# Patient Record
Sex: FEMALE | Race: WHITE | NOT HISPANIC OR LATINO | Employment: OTHER | URBAN - METROPOLITAN AREA
[De-identification: names, ages, dates, MRNs, and addresses within clinical notes are randomized per-mention and may not be internally consistent; named-entity substitution may affect disease eponyms.]

---

## 2018-03-06 ENCOUNTER — PROCEDURE VISIT CONVERTED (OUTPATIENT)
Dept: PODIATRY | Facility: CLINIC | Age: 63
End: 2018-03-06
Attending: PODIATRIST

## 2018-03-06 ENCOUNTER — CONVERSION ENCOUNTER (OUTPATIENT)
Dept: PODIATRY | Facility: CLINIC | Age: 63
End: 2018-03-06

## 2018-03-19 ENCOUNTER — CONVERSION ENCOUNTER (OUTPATIENT)
Dept: CARDIOLOGY | Facility: CLINIC | Age: 63
End: 2018-03-19

## 2018-03-19 ENCOUNTER — OFFICE VISIT CONVERTED (OUTPATIENT)
Dept: CARDIOLOGY | Facility: CLINIC | Age: 63
End: 2018-03-19
Attending: INTERNAL MEDICINE

## 2018-06-11 ENCOUNTER — CONVERSION ENCOUNTER (OUTPATIENT)
Dept: OTHER | Facility: HOSPITAL | Age: 63
End: 2018-06-11

## 2018-06-11 ENCOUNTER — PROCEDURE VISIT CONVERTED (OUTPATIENT)
Dept: PODIATRY | Facility: CLINIC | Age: 63
End: 2018-06-11
Attending: PODIATRIST

## 2018-09-10 ENCOUNTER — CONVERSION ENCOUNTER (OUTPATIENT)
Dept: PODIATRY | Facility: CLINIC | Age: 63
End: 2018-09-10

## 2018-09-10 ENCOUNTER — PROCEDURE VISIT CONVERTED (OUTPATIENT)
Dept: PODIATRY | Facility: CLINIC | Age: 63
End: 2018-09-10
Attending: PODIATRIST

## 2019-07-29 ENCOUNTER — OFFICE VISIT CONVERTED (OUTPATIENT)
Dept: CARDIOLOGY | Facility: CLINIC | Age: 64
End: 2019-07-29
Attending: INTERNAL MEDICINE

## 2019-07-29 ENCOUNTER — CONVERSION ENCOUNTER (OUTPATIENT)
Dept: CARDIOLOGY | Facility: CLINIC | Age: 64
End: 2019-07-29

## 2019-08-28 ENCOUNTER — CONVERSION ENCOUNTER (OUTPATIENT)
Dept: CARDIOLOGY | Facility: CLINIC | Age: 64
End: 2019-08-28
Attending: INTERNAL MEDICINE

## 2019-11-07 ENCOUNTER — HOSPITAL ENCOUNTER (OUTPATIENT)
Dept: OTHER | Facility: HOSPITAL | Age: 64
Discharge: HOME | End: 2019-11-07

## 2019-11-07 LAB
25(OH)D3 SERPL-MCNC: 29.8 NG/ML (ref 30–100)
ALBUMIN SERPL-MCNC: 3.6 G/DL (ref 3.5–5)
ALBUMIN/GLOB SERPL: 1.2 {RATIO} (ref 1.4–2.6)
ALP SERPL-CCNC: 57 U/L (ref 43–160)
ALT SERPL-CCNC: 32 U/L (ref 10–40)
ANION GAP SERPL CALC-SCNC: 17 MMOL/L (ref 8–19)
AST SERPL-CCNC: 30 U/L (ref 15–50)
BASOPHILS # BLD AUTO: 0.09 10*3/UL (ref 0–0.2)
BASOPHILS NFR BLD AUTO: 1.1 % (ref 0–3)
BILIRUB SERPL-MCNC: 0.54 MG/DL (ref 0.2–1.3)
BUN SERPL-MCNC: 13 MG/DL (ref 5–25)
BUN/CREAT SERPL: 19 {RATIO} (ref 6–20)
CALCIUM SERPL-MCNC: 9.6 MG/DL (ref 8.7–10.4)
CHLORIDE SERPL-SCNC: 101 MMOL/L (ref 99–111)
CONV ABS IMM GRAN: 0.04 10*3/UL (ref 0–0.2)
CONV CO2: 25 MMOL/L (ref 22–32)
CONV IMMATURE GRAN: 0.5 % (ref 0–1.8)
CONV TOTAL PROTEIN: 6.5 G/DL (ref 6.3–8.2)
CREAT UR-MCNC: 0.69 MG/DL (ref 0.5–0.9)
DEPRECATED RDW RBC AUTO: 48.1 FL (ref 36.4–46.3)
EOSINOPHIL # BLD AUTO: 0.16 10*3/UL (ref 0–0.7)
EOSINOPHIL # BLD AUTO: 1.9 % (ref 0–7)
ERYTHROCYTE [DISTWIDTH] IN BLOOD BY AUTOMATED COUNT: 15 % (ref 11.7–14.4)
GFR SERPLBLD BASED ON 1.73 SQ M-ARVRAT: >60 ML/MIN/{1.73_M2}
GLOBULIN UR ELPH-MCNC: 2.9 G/DL (ref 2–3.5)
GLUCOSE SERPL-MCNC: 115 MG/DL (ref 65–99)
HCT VFR BLD AUTO: 38.1 % (ref 37–47)
HGB BLD-MCNC: 11.9 G/DL (ref 12–16)
LYMPHOCYTES # BLD AUTO: 2.34 10*3/UL (ref 1–5)
LYMPHOCYTES NFR BLD AUTO: 27.7 % (ref 20–45)
MCH RBC QN AUTO: 28 PG (ref 27–31)
MCHC RBC AUTO-ENTMCNC: 31.2 G/DL (ref 33–37)
MCV RBC AUTO: 89.6 FL (ref 81–99)
MONOCYTES # BLD AUTO: 0.85 10*3/UL (ref 0.2–1.2)
MONOCYTES NFR BLD AUTO: 10 % (ref 3–10)
NEUTROPHILS # BLD AUTO: 4.98 10*3/UL (ref 2–8)
NEUTROPHILS NFR BLD AUTO: 58.8 % (ref 30–85)
NRBC CBCN: 0 % (ref 0–0.7)
OSMOLALITY SERPL CALC.SUM OF ELEC: 287 MOSM/KG (ref 273–304)
PLATELET # BLD AUTO: 187 10*3/UL (ref 130–400)
PMV BLD AUTO: 11.9 FL (ref 9.4–12.3)
POTASSIUM SERPL-SCNC: 5.1 MMOL/L (ref 3.5–5.3)
RBC # BLD AUTO: 4.25 10*6/UL (ref 4.2–5.4)
SODIUM SERPL-SCNC: 138 MMOL/L (ref 135–147)
WBC # BLD AUTO: 8.46 10*3/UL (ref 4.8–10.8)

## 2020-02-01 ENCOUNTER — LAB REQUISITION (OUTPATIENT)
Dept: LAB | Facility: HOSPITAL | Age: 65
End: 2020-02-01

## 2020-02-01 DIAGNOSIS — Z00.00 ROUTINE GENERAL MEDICAL EXAMINATION AT A HEALTH CARE FACILITY: ICD-10-CM

## 2020-02-01 LAB
BACTERIA UR QL AUTO: ABNORMAL /HPF
BILIRUB UR QL STRIP: NEGATIVE
CLARITY UR: ABNORMAL
COLOR UR: YELLOW
GLUCOSE UR STRIP-MCNC: NEGATIVE MG/DL
HGB UR QL STRIP.AUTO: NEGATIVE
HYALINE CASTS UR QL AUTO: ABNORMAL /LPF
KETONES UR QL STRIP: NEGATIVE
LEUKOCYTE ESTERASE UR QL STRIP.AUTO: ABNORMAL
NITRITE UR QL STRIP: NEGATIVE
PH UR STRIP.AUTO: 6.5 [PH] (ref 5–8)
PROT UR QL STRIP: NEGATIVE
RBC # UR: ABNORMAL /HPF
REF LAB TEST METHOD: ABNORMAL
SP GR UR STRIP: 1.02 (ref 1–1.03)
SQUAMOUS #/AREA URNS HPF: ABNORMAL /HPF
UROBILINOGEN UR QL STRIP: ABNORMAL
WBC UR QL AUTO: ABNORMAL /HPF

## 2020-02-01 PROCEDURE — 81001 URINALYSIS AUTO W/SCOPE: CPT

## 2021-05-15 VITALS
HEIGHT: 67 IN | HEART RATE: 56 BPM | WEIGHT: 202 LBS | BODY MASS INDEX: 31.71 KG/M2 | DIASTOLIC BLOOD PRESSURE: 88 MMHG | SYSTOLIC BLOOD PRESSURE: 166 MMHG

## 2021-05-16 VITALS
DIASTOLIC BLOOD PRESSURE: 90 MMHG | WEIGHT: 205 LBS | SYSTOLIC BLOOD PRESSURE: 140 MMHG | OXYGEN SATURATION: 97 % | HEIGHT: 66 IN | BODY MASS INDEX: 32.95 KG/M2 | HEART RATE: 63 BPM

## 2021-05-16 VITALS — HEIGHT: 67 IN | WEIGHT: 193 LBS | OXYGEN SATURATION: 97 % | HEART RATE: 59 BPM | BODY MASS INDEX: 30.29 KG/M2

## 2021-05-16 VITALS
BODY MASS INDEX: 30.61 KG/M2 | DIASTOLIC BLOOD PRESSURE: 114 MMHG | WEIGHT: 195 LBS | SYSTOLIC BLOOD PRESSURE: 216 MMHG | HEART RATE: 60 BPM | HEIGHT: 67 IN

## 2021-05-16 VITALS
WEIGHT: 194 LBS | HEART RATE: 63 BPM | RESPIRATION RATE: 20 BRPM | BODY MASS INDEX: 30.45 KG/M2 | HEIGHT: 67 IN | OXYGEN SATURATION: 96 %

## 2021-08-20 PROBLEM — I10 BENIGN ESSENTIAL HTN: Status: ACTIVE | Noted: 2021-08-20

## 2021-08-23 ENCOUNTER — OFFICE VISIT (OUTPATIENT)
Dept: CARDIOLOGY | Facility: CLINIC | Age: 66
End: 2021-08-23

## 2021-08-23 VITALS
DIASTOLIC BLOOD PRESSURE: 66 MMHG | HEART RATE: 63 BPM | SYSTOLIC BLOOD PRESSURE: 124 MMHG | BODY MASS INDEX: 31.23 KG/M2 | HEIGHT: 67 IN | WEIGHT: 199 LBS

## 2021-08-23 DIAGNOSIS — I10 BENIGN ESSENTIAL HTN: ICD-10-CM

## 2021-08-23 DIAGNOSIS — R07.89 OTHER CHEST PAIN: Primary | ICD-10-CM

## 2021-08-23 DIAGNOSIS — I48.0 PAROXYSMAL ATRIAL FIBRILLATION (HCC): ICD-10-CM

## 2021-08-23 PROCEDURE — 93000 ELECTROCARDIOGRAM COMPLETE: CPT | Performed by: INTERNAL MEDICINE

## 2021-08-23 PROCEDURE — 99214 OFFICE O/P EST MOD 30 MIN: CPT | Performed by: NURSE PRACTITIONER

## 2021-08-23 RX ORDER — CHOLECALCIFEROL (VITAMIN D3) 125 MCG
CAPSULE ORAL
COMMUNITY

## 2021-08-23 RX ORDER — HYDROCHLOROTHIAZIDE 25 MG/1
25 TABLET ORAL DAILY
COMMUNITY
Start: 2021-08-21

## 2021-08-23 RX ORDER — EZETIMIBE 10 MG/1
10 TABLET ORAL DAILY
COMMUNITY
Start: 2021-08-17

## 2021-08-23 RX ORDER — FAMOTIDINE 20 MG/1
TABLET, FILM COATED ORAL DAILY
COMMUNITY
Start: 2021-08-21 | End: 2022-11-23

## 2021-08-23 RX ORDER — AMLODIPINE BESYLATE 5 MG/1
5 TABLET ORAL DAILY
COMMUNITY
Start: 2021-08-11 | End: 2022-11-23

## 2021-08-23 RX ORDER — LEVOTHYROXINE SODIUM 0.05 MG/1
50 TABLET ORAL DAILY
COMMUNITY

## 2021-08-23 RX ORDER — FENOFIBRATE 160 MG/1
160 TABLET ORAL DAILY
COMMUNITY
Start: 2021-07-28

## 2021-08-23 RX ORDER — SPIRONOLACTONE 25 MG/1
25 TABLET ORAL DAILY
COMMUNITY
Start: 2021-08-13

## 2021-08-23 RX ORDER — APIXABAN 5 MG/1
5 TABLET, FILM COATED ORAL 2 TIMES DAILY
COMMUNITY
Start: 2021-08-09

## 2021-08-23 RX ORDER — SERTRALINE HYDROCHLORIDE 25 MG/1
25 TABLET, FILM COATED ORAL DAILY
COMMUNITY
Start: 2021-08-16

## 2021-08-23 RX ORDER — RISPERIDONE 0.5 MG/1
TABLET ORAL DAILY
COMMUNITY
Start: 2021-08-13

## 2021-08-23 RX ORDER — NITROGLYCERIN 40 MG/1
1 PATCH TRANSDERMAL SEE ADMIN INSTRUCTIONS
Qty: 90 PATCH | Refills: 3 | Status: SHIPPED | OUTPATIENT
Start: 2021-08-23

## 2021-08-23 NOTE — PROGRESS NOTES
Chief Complaint  Follow-up, Chest Pain, Hypertension, Cardiomyopathy, Atrial Fibrillation, and Hyperlipidemia    Subjective            Lydia Willoughby presents to [facility] CARDIOLOGY  She is a 66-year-old white female has not been in the office in over 2 years now.  She is now resides in a nursing home after a CVA.  Continues to complain of chest pain described it as occurring when she is walking with her walker or doing physical therapy.  Vague in description but is mostly associate with right arm discomfort.  States it lasted few minutes. Denies shortness of breath, palpitations, swelling, dizziness, syncope, PND, and orthopnea.  Has had both Covid vaccines.              Past History:    Past Medical History:   Diagnosis Date   • Congestive heart failure (CHF) (CMS/HCC)    • Diabetes mellitus (CMS/HCC) 09/10/2018   • Foot cramps    • Foot pain, left    • Foot pain, right    • Fracture    • Heart attack (CMS/HCC)    • High blood cholesterol    • Hypertension    • Ingrowing toenail 09/10/2018   • Irregular heart beat    • Obesity    • Polyneuropathy    • Pressure ulcer, stage 1    • Stroke (CMS/HCC)    • Tinea unguium    • Type 2 diabetes mellitus with foot ulcer (CMS/HCC)    • Type 2 diabetes mellitus with polyneuropathy (CMS/HCC)         Family History: family history includes Cancer in her father; Diabetes in her brother and mother; Heart attack in an other family member; Stroke in her brother.     Social History: reports that she has quit smoking. She has quit using smokeless tobacco. She reports current alcohol use. She reports that she does not use drugs.    Allergies: Penicillins      Past Surgical History:   Procedure Laterality Date   • CERVICAL DISC SURGERY     • EYE SURGERY      CATARACT   • TONSILLECTOMY     • TUBAL ABDOMINAL LIGATION          Prior to Admission medications    Medication Sig Start Date End Date Taking? Authorizing Provider   amLODIPine (NORVASC) 5 MG tablet Take 5 mg  by mouth Daily. 8/11/21  Yes Kristina Newton MD   Cholecalciferol (Vitamin D3) 50 MCG (2000 UT) tablet Take  by mouth.   Yes Kristina Newton MD   Cyanocobalamin (VITAMIN B-12 IJ) Inject  as directed.   Yes Kristina Newton MD   Eliquis 5 MG tablet tablet Take 5 mg by mouth 2 (two) times a day. 8/9/21  Yes Kristina Newton MD   ezetimibe (ZETIA) 10 MG tablet Take 10 mg by mouth Daily. 8/17/21  Yes Kristina Newton MD   famotidine (PEPCID) 20 MG tablet Daily. 8/21/21  Yes Provider, Historical, MD   fenofibrate 160 MG tablet Take 160 mg by mouth Daily. 7/28/21  Yes Kristina Newton MD   hydroCHLOROthiazide (HYDRODIURIL) 25 MG tablet Take 25 mg by mouth Daily. 8/21/21  Yes Kristina Newton MD   levothyroxine (SYNTHROID, LEVOTHROID) 50 MCG tablet Take 50 mcg by mouth Daily.   Yes Kristina Newton MD   metFORMIN (GLUCOPHAGE) 1000 MG tablet  7/19/21  Yes Kristina Newton MD   risperiDONE (risperDAL) 0.5 MG tablet Daily. 8/13/21  Yes Kristina Newton MD   sertraline (ZOLOFT) 25 MG tablet Take 25 mg by mouth Daily. 8/16/21  Yes Kristina Newton MD   sertraline (ZOLOFT) 50 MG tablet Take 50 mg by mouth Daily.   Yes Kristina Newton MD   spironolactone (ALDACTONE) 25 MG tablet Take 25 mg by mouth 2 (two) times a day. 8/13/21  Yes Kristina Newton MD        Review of Systems   Respiratory: Positive for cough.    Cardiovascular: Positive for chest pain.   Neurological: Positive for weakness.   All other systems reviewed and are negative.       Objective     Physical Exam  Constitutional:       Appearance: She is obese.      Comments: In a wheelchair accompanied by nursing home staff   Eyes:      Pupils: Pupils are equal, round, and reactive to light.   Neck:      Vascular: No carotid bruit.   Cardiovascular:      Rate and Rhythm: Normal rate and regular rhythm.      Pulses: Normal pulses.      Heart sounds: Normal heart sounds.   Pulmonary:      Effort: Pulmonary  "effort is normal.      Breath sounds: Normal breath sounds.   Abdominal:      General: Bowel sounds are normal.      Palpations: Abdomen is soft.   Musculoskeletal:      Right lower leg: No edema.      Left lower leg: No edema.   Neurological:      Mental Status: She is alert.      Comments: Some right-sided hemiparesis   Psychiatric:         Mood and Affect: Mood normal.         Behavior: Behavior normal.       /66   Pulse 63   Ht 170.2 cm (67\")   Wt 90.3 kg (199 lb)   BMI 31.17 kg/m²       Vitals:    08/23/21 1513   BP: 124/66   Pulse: 63       Result Review :         The following data was reviewed by: CYNDY Mason on 08/23/2021:             Labs monitored by [facility].  We will try to obtain the last results from June.    ECG 12 Lead    Date/Time: 8/23/2021 4:26 PM  Performed by: Alejandra Barton MD  Authorized by: Alejandra Barton MD   Comparison: compared with previous ECG   Comments: Sinus rhythm prolonged SD interval left bundle branch block no change compared to previous EKG                Assessment and Plan        Diagnoses and all orders for this visit:    1. Other chest pain (Primary)  Assessment & Plan:  Atypical chest pain.  Stress test from August 2019 was reviewed.  EKG unchanged.  Will start on Nitropatch 0.2 mg/hr. she will call in 2 to 3 weeks if chest pain persist.  At that time we will consider diagnostic cardiac cath.    Orders:  -     nitroglycerin (NITRODUR) 0.2 MG/HR patch; Place 1 patch on the skin as directed by provider See Admin Instructions. Apply patch daily, remove at night for at least 10 hours  Dispense: 90 patch; Refill: 3    2. Paroxysmal atrial fibrillation (CMS/HCC)  Assessment & Plan:  Currently in sinus.  Continue Eliquis.      3. Benign essential HTN  Assessment & Plan:  Controlled.  Continue hydrochlorothiazide, spironolactone, and amlodipine.              Follow Up     Return in about 3 months (around 11/23/2021), or 3-4 months, for " with Dr. Barton.    Patient was given instructions and counseling regarding her condition or for health maintenance advice. Please see specific information pulled into the AVS if appropriate.       CYNDY Jj  08/23/21 15:23 EDT

## 2021-08-23 NOTE — ASSESSMENT & PLAN NOTE
Atypical chest pain.  Stress test from August 2019 was reviewed.  EKG unchanged.  Will start on Nitropatch 0.2 mg/hr. she will call in 2 to 3 weeks if chest pain persist.  At that time we will consider diagnostic cardiac cath.

## 2022-05-18 ENCOUNTER — OFFICE VISIT (OUTPATIENT)
Dept: CARDIOLOGY | Facility: CLINIC | Age: 67
End: 2022-05-18

## 2022-05-18 VITALS
HEART RATE: 52 BPM | DIASTOLIC BLOOD PRESSURE: 52 MMHG | HEIGHT: 67 IN | BODY MASS INDEX: 31.17 KG/M2 | SYSTOLIC BLOOD PRESSURE: 119 MMHG

## 2022-05-18 DIAGNOSIS — R07.89 OTHER CHEST PAIN: ICD-10-CM

## 2022-05-18 DIAGNOSIS — E78.5 HYPERLIPIDEMIA LDL GOAL <70: ICD-10-CM

## 2022-05-18 DIAGNOSIS — I50.22 CHRONIC SYSTOLIC CONGESTIVE HEART FAILURE: Primary | ICD-10-CM

## 2022-05-18 DIAGNOSIS — I48.0 PAROXYSMAL ATRIAL FIBRILLATION: ICD-10-CM

## 2022-05-18 DIAGNOSIS — I10 BENIGN ESSENTIAL HTN: ICD-10-CM

## 2022-05-18 PROCEDURE — 99215 OFFICE O/P EST HI 40 MIN: CPT | Performed by: INTERNAL MEDICINE

## 2022-05-18 RX ORDER — METOPROLOL SUCCINATE 50 MG/1
50 TABLET, EXTENDED RELEASE ORAL DAILY
COMMUNITY

## 2022-05-18 NOTE — PROGRESS NOTES
Office Visit    Chief Complaint  Hypertension and Atrial Fibrillation    Subjective            Lydia Willoughby presents to [facility] CARDIOLOGY  Lydia is a 67 years old female patient who comes to the ER office after Quite some time.  She denies any chest pain.  She has occasional palpitations that last for a few seconds to a few minutes and resolve spontaneously.  She denies dizziness or syncope.  She has had previous dilated cardiomyopathy with left bundle branch block with recovery of LV function subsequently.  Her last echo in 2019 was 50% EF.  She had a cardiac cath in 1997.  She was having paroxysmal A. fib and was on Eliquis but did not take it regularly and suffered a stroke.  Since then she has been on Eliquis on a regular basis.  She is also intolerant to statins      Past Medical History:   Diagnosis Date   • Congestive heart failure (CHF) ([facility])    • Diabetes mellitus ([facility]) 09/10/2018   • Foot cramps    • Foot pain, left    • Foot pain, right    • Fracture    • Heart attack ([facility])    • High blood cholesterol    • Hypertension    • Ingrowing toenail 09/10/2018   • Irregular heart beat    • Obesity    • Polyneuropathy    • Pressure ulcer, stage 1    • Stroke ([facility])    • Tinea unguium    • Type 2 diabetes mellitus with foot ulcer ([facility])    • Type 2 diabetes mellitus with polyneuropathy ([facility])        Allergies   Allergen Reactions   • Penicillins Rash        Past Surgical History:   Procedure Laterality Date   • CERVICAL DISC SURGERY     • EYE SURGERY      CATARACT   • TONSILLECTOMY     • TUBAL ABDOMINAL LIGATION          Social History     Tobacco Use   • Smoking status: Former Smoker   • Smokeless tobacco: Former User   Vaping Use   • Vaping Use: Never used   Substance Use Topics   • Alcohol use: Yes     Comment: light   • Drug use: Never       Family History   Problem Relation Age of Onset   • Diabetes Mother    • Cancer Father    • Stroke Brother    • Diabetes Brother    • Heart attack  Other         Prior to Admission medications    Medication Sig Start Date End Date Taking? Authorizing Provider   amLODIPine (NORVASC) 5 MG tablet Take 5 mg by mouth Daily. 8/11/21  Yes Kristina Newton MD   Cholecalciferol (Vitamin D3) 50 MCG (2000 UT) tablet Take  by mouth.   Yes Kristina Newton MD   Cyanocobalamin (VITAMIN B-12 IJ) Inject  as directed.   Yes Kristina Newton MD   Eliquis 5 MG tablet tablet Take 5 mg by mouth 2 (two) times a day. 8/9/21  Yes Kristina Newton MD   ezetimibe (ZETIA) 10 MG tablet Take 10 mg by mouth Daily. 8/17/21  Yes Kristina Newton MD   famotidine (PEPCID) 20 MG tablet Daily. 8/21/21  Yes Kristina Newton MD   fenofibrate 160 MG tablet Take 160 mg by mouth Daily. 7/28/21  Yes Kristina Newton MD   hydroCHLOROthiazide (HYDRODIURIL) 25 MG tablet Take 25 mg by mouth Daily. 8/21/21  Yes Kristina Newton MD   levothyroxine (SYNTHROID, LEVOTHROID) 50 MCG tablet Take 50 mcg by mouth Daily.   Yes Kristina Newton MD   metFORMIN (GLUCOPHAGE) 1000 MG tablet  7/19/21  Yes Kristina Newton MD   metoprolol succinate XL (TOPROL-XL) 50 MG 24 hr tablet Take 50 mg by mouth Daily.   Yes Kristina Newton MD   nitroglycerin (NITRODUR) 0.2 MG/HR patch Place 1 patch on the skin as directed by provider See Admin Instructions. Apply patch daily, remove at night for at least 10 hours 8/23/21  Yes Krys Peraza June, APRN   risperiDONE (risperDAL) 0.5 MG tablet Daily. 8/13/21  Yes Kristina Newton MD   sertraline (ZOLOFT) 25 MG tablet Take 25 mg by mouth Daily. 8/16/21  Yes Kristina Newton MD   sertraline (ZOLOFT) 50 MG tablet Take 50 mg by mouth Daily.   Yes Kristina Newton MD   spironolactone (ALDACTONE) 25 MG tablet Take 25 mg by mouth Daily. 8/13/21  Yes Kristina Newton MD        Review of Systems   Constitutional: Negative for fatigue.   Respiratory: Negative for cough and shortness of breath.    Cardiovascular: Negative  "for chest pain, palpitations and leg swelling.   Neurological: Negative for dizziness.        Objective     /52   Pulse 52   Ht 170.2 cm (67\")   BMI 31.17 kg/m²       Physical Exam  Constitutional:       General: She is awake.      Appearance: Normal appearance.   Neck:      Thyroid: No thyromegaly.      Vascular: No carotid bruit or JVD.   Cardiovascular:      Rate and Rhythm: Normal rate and regular rhythm.      Chest Wall: PMI is not displaced.      Pulses: Normal pulses.      Heart sounds: Normal heart sounds, S1 normal and S2 normal. No murmur heard.    No friction rub. No gallop. No S3 or S4 sounds.   Pulmonary:      Effort: Pulmonary effort is normal.      Breath sounds: Normal breath sounds and air entry. No wheezing, rhonchi or rales.   Abdominal:      General: Bowel sounds are normal.      Palpations: Abdomen is soft. There is no mass.      Tenderness: There is no abdominal tenderness.   Musculoskeletal:      Cervical back: Neck supple.      Right lower leg: No edema.      Left lower leg: No edema.   Neurological:      Mental Status: She is alert and oriented to person, place, and time.   Psychiatric:         Mood and Affect: Mood normal.         Behavior: Behavior is cooperative.           Result Review :                      Her outside labs were reviewed.  CBC chemistry panel was normal in March with an LDL of 128     Assessment and Plan        Diagnoses and all orders for this visit:    1. Chronic systolic congestive heart failure (HCC) (Primary)  Assessment & Plan:  Patient has had chronic systolic heart failure for many years.  She is well compensated.  She used to be on irbesartan but that seems to have been stopped for some reason.  Will talk to the physician at [facility].      2. Benign essential HTN  Assessment & Plan:  Her blood pressure is well controlled, however, since it would be advisable for her to be on an ARB drug both from the standpoint of diabetes as well as " chronic systolic heart failure it may be better to switch the amlodipine for an ARB agent.      3. Paroxysmal atrial fibrillation (HCC)  Assessment & Plan:  She has had paroxysmal atrial fibs and had a CVA because she had stopped taking her Eliquis.  She is currently on 5 twice daily and will continue the same      4. Other chest pain  Assessment & Plan:  Her exertional chest pain has completely resolved.  She has occasional palpitations but not as bad as before.  She will continue the metoprolol in the current dosage      5. Hyperlipidemia LDL goal <70  Assessment & Plan:  Her lipids have always been difficult to control.  She is also very intolerant to statins.  Currently she is on a combination of ezetimibe and fenofibrate and her LDL is 128 in March.  I would suggest that we add Nexletol or PCSK9 inhibitor to her regimen        I spoke to Dr. Langston personally and relayed the message regarding ARB drugs and tighter control of her LDL..  We have given her samples of Nexletol and Nexlizet to try    Follow Up     Return for 1 year fu.    Patient was given instructions and counseling regarding her condition or for health maintenance advice. Please see specific information pulled into the AVS if appropriate.    Total time spent 40 minutes including 20 minutes of face-to-face discussion with the patient.  Pre chart assessment of prior cath report, echo report and prior office records had to be performed and I personally spoke with Dr. Langston regarding her condition and my recommendations.    Alejandra Barton MD  05/18/22 11:58 EDT

## 2022-05-18 NOTE — ASSESSMENT & PLAN NOTE
Her blood pressure is well controlled, however, since it would be advisable for her to be on an ARB drug both from the standpoint of diabetes as well as chronic systolic heart failure it may be better to switch the amlodipine for an ARB agent.

## 2022-05-18 NOTE — ASSESSMENT & PLAN NOTE
She has had paroxysmal atrial fibs and had a CVA because she had stopped taking her Eliquis.  She is currently on 5 twice daily and will continue the same

## 2022-05-18 NOTE — ASSESSMENT & PLAN NOTE
Patient has had chronic systolic heart failure for many years.  She is well compensated.  She used to be on irbesartan but that seems to have been stopped for some reason.  Will talk to the physician at [facility].

## 2022-05-18 NOTE — ASSESSMENT & PLAN NOTE
Her lipids have always been difficult to control.  She is also very intolerant to statins.  Currently she is on a combination of ezetimibe and fenofibrate and her LDL is 128 in March.  I would suggest that we add Nexletol or PCSK9 inhibitor to her regimen

## 2022-05-18 NOTE — ASSESSMENT & PLAN NOTE
Her exertional chest pain has completely resolved.  She has occasional palpitations but not as bad as before.  She will continue the metoprolol in the current dosage

## 2022-11-23 ENCOUNTER — OFFICE VISIT (OUTPATIENT)
Dept: CARDIOLOGY | Facility: CLINIC | Age: 67
End: 2022-11-23

## 2022-11-23 VITALS
SYSTOLIC BLOOD PRESSURE: 107 MMHG | BODY MASS INDEX: 31.17 KG/M2 | DIASTOLIC BLOOD PRESSURE: 56 MMHG | HEART RATE: 60 BPM | HEIGHT: 67 IN

## 2022-11-23 DIAGNOSIS — E78.2 MIXED DYSLIPIDEMIA: ICD-10-CM

## 2022-11-23 DIAGNOSIS — I48.0 PAROXYSMAL ATRIAL FIBRILLATION: Primary | ICD-10-CM

## 2022-11-23 DIAGNOSIS — I10 BENIGN ESSENTIAL HTN: ICD-10-CM

## 2022-11-23 PROCEDURE — 99214 OFFICE O/P EST MOD 30 MIN: CPT | Performed by: INTERNAL MEDICINE

## 2022-11-23 RX ORDER — LOSARTAN POTASSIUM 25 MG/1
TABLET ORAL DAILY
COMMUNITY
Start: 2022-11-22

## 2022-11-23 NOTE — PROGRESS NOTES
Chief Complaint  Atrial Fibrillation, Chest Pain, Hyperlipidemia, Hypertension, and Congestive Heart Failure    Subjective      Patient is here for follow-up visit.  She has been feeling well.  She has no complaints or concerns today.  She has no chest discomfort or dyspnea.  She has no orthopnea, edema, palpitations, presyncope or syncope.  She has right-sided weakness related to previous stroke.  She ambulates in a wheelchair.    Past Medical History:   Diagnosis Date   • Congestive heart failure (CHF) ([facility])    • Diabetes mellitus (HCC) 09/10/2018   • Foot cramps    • Foot pain, left    • Foot pain, right    • Fracture    • Heart attack (HCC)    • High blood cholesterol    • Hypertension    • Ingrowing toenail 09/10/2018   • Irregular heart beat    • Obesity    • Polyneuropathy    • Pressure ulcer, stage 1    • Stroke ([facility])    • Tinea unguium    • Type 2 diabetes mellitus with foot ulcer (HCC)    • Type 2 diabetes mellitus with polyneuropathy (HCC)          Current Outpatient Medications:   •  Cholecalciferol (Vitamin D3) 50 MCG (2000 UT) tablet, Take  by mouth., Disp: , Rfl:   •  Cyanocobalamin (VITAMIN B-12 IJ), Inject  as directed., Disp: , Rfl:   •  Eliquis 5 MG tablet tablet, Take 5 mg by mouth 2 (two) times a day., Disp: , Rfl:   •  ezetimibe (ZETIA) 10 MG tablet, Take 10 mg by mouth Daily., Disp: , Rfl:   •  fenofibrate 160 MG tablet, Take 160 mg by mouth Daily., Disp: , Rfl:   •  hydroCHLOROthiazide (HYDRODIURIL) 25 MG tablet, Take 25 mg by mouth Daily., Disp: , Rfl:   •  levothyroxine (SYNTHROID, LEVOTHROID) 50 MCG tablet, Take 50 mcg by mouth Daily., Disp: , Rfl:   •  losartan (COZAAR) 25 MG tablet, Daily., Disp: , Rfl:   •  metFORMIN (GLUCOPHAGE) 1000 MG tablet, 2 (Two) Times a Day With Meals., Disp: , Rfl:   •  metoprolol succinate XL (TOPROL-XL) 50 MG 24 hr tablet, Take 50 mg by mouth Daily., Disp: , Rfl:   •  nitroglycerin (NITRODUR) 0.2 MG/HR patch, Place 1 patch on the skin as directed by provider  "See Admin Instructions. Apply patch daily, remove at night for at least 10 hours, Disp: 90 patch, Rfl: 3  •  risperiDONE (risperDAL) 0.5 MG tablet, Daily., Disp: , Rfl:   •  sertraline (ZOLOFT) 25 MG tablet, Take 25 mg by mouth Daily., Disp: , Rfl:   •  sertraline (ZOLOFT) 50 MG tablet, Take 50 mg by mouth Daily., Disp: , Rfl:   •  spironolactone (ALDACTONE) 25 MG tablet, Take 25 mg by mouth Daily., Disp: , Rfl:     Medications Discontinued During This Encounter   Medication Reason   • amLODIPine (NORVASC) 5 MG tablet *Therapy completed   • famotidine (PEPCID) 20 MG tablet *Therapy completed     Allergies   Allergen Reactions   • Penicillins Rash        Social History     Tobacco Use   • Smoking status: Former   • Smokeless tobacco: Former   Vaping Use   • Vaping Use: Never used   Substance Use Topics   • Alcohol use: Yes     Comment: light   • Drug use: Never       Family History   Problem Relation Age of Onset   • Diabetes Mother    • Cancer Father    • Stroke Brother    • Diabetes Brother    • Heart attack Other         Objective     /56   Pulse 60   Ht 170.2 cm (67\")   BMI 31.17 kg/m²       Physical Exam    General Appearance:   · no acute distress  · Alert and oriented x3  HENT:   · lips not cyanotic  · Atraumatic  Neck:  · No jvd   · supple  Respiratory:  · no respiratory distress  · normal breath sounds  · no rales  Cardiovascular:  · no S3, no S4   · Grade 2/6 systolic ejection murmur at the base  · no rub  Extremities  · No cyanosis  · lower extremity edema: none    Skin:   · warm, dry  · No rashes      Result Review :     No results found for: PROBNP       Lab Results   Component Value Date    TSH 3.950 11/03/2019      Lab Results   Component Value Date    FREET4 1.2 11/03/2019      No results found for: DDIMERQUANT  Magnesium   Date Value Ref Range Status   11/03/2019 2.07 1.60 - 2.30 mg/dL Final      No results found for: DIGOXIN   Lab Results   Component Value Date    TROPONINT <0.01 10/29/2019 "             No results found for: POCTROP                   Diagnoses and all orders for this visit:    1. Paroxysmal atrial fibrillation (HCC) (Primary)    2. Mixed dyslipidemia  -     Lipid Panel; Future    3. Benign essential HTN        Assessment:    -Paroxysmal atrial fibrillation: Currently in normal sinus rhythm.  LVEF is within normal limits.  Continue current medical therapy including Eliquis and metoprolol.    -Hypertension: Stable on current regimen which will be continued.    -Mixed dyslipidemia: She is a statins intolerant.  Continue fenofibrate and Zetia.  Lipid profile will be checked.    Follow Up     Return in about 6 months (around 5/23/2023) for With Shivani NAYLOR.        Patient was given instructions and counseling regarding her condition or for health maintenance advice. Please see specific information pulled into the AVS if appropriate.

## 2023-05-23 ENCOUNTER — OFFICE VISIT (OUTPATIENT)
Dept: CARDIOLOGY | Facility: CLINIC | Age: 68
End: 2023-05-23
Payer: MEDICARE

## 2023-05-23 VITALS
DIASTOLIC BLOOD PRESSURE: 58 MMHG | SYSTOLIC BLOOD PRESSURE: 101 MMHG | HEART RATE: 57 BPM | WEIGHT: 198 LBS | HEIGHT: 67 IN | BODY MASS INDEX: 31.08 KG/M2

## 2023-05-23 DIAGNOSIS — R01.1 HEART MURMUR: ICD-10-CM

## 2023-05-23 DIAGNOSIS — I10 BENIGN ESSENTIAL HTN: ICD-10-CM

## 2023-05-23 DIAGNOSIS — E78.2 MIXED DYSLIPIDEMIA: ICD-10-CM

## 2023-05-23 DIAGNOSIS — I48.0 PAROXYSMAL ATRIAL FIBRILLATION: Primary | ICD-10-CM

## 2023-05-23 PROCEDURE — 3074F SYST BP LT 130 MM HG: CPT

## 2023-05-23 PROCEDURE — 99214 OFFICE O/P EST MOD 30 MIN: CPT

## 2023-05-23 PROCEDURE — 1159F MED LIST DOCD IN RCRD: CPT

## 2023-05-23 PROCEDURE — 1160F RVW MEDS BY RX/DR IN RCRD: CPT

## 2023-05-23 PROCEDURE — 3078F DIAST BP <80 MM HG: CPT

## 2023-05-23 RX ORDER — CHOLECALCIFEROL (VITAMIN D3) 125 MCG
5 CAPSULE ORAL
COMMUNITY

## 2023-05-23 RX ORDER — SENNA PLUS 8.6 MG/1
1 TABLET ORAL DAILY
COMMUNITY

## 2023-05-23 RX ORDER — ACETAMINOPHEN 325 MG/1
650 TABLET ORAL EVERY 6 HOURS PRN
COMMUNITY

## 2023-05-23 RX ORDER — NYSTATIN 100000 [USP'U]/G
POWDER TOPICAL
COMMUNITY
Start: 2023-05-04

## 2023-05-23 NOTE — PROGRESS NOTES
Chief Complaint  Atrial Fibrillation, Follow-up, and Congestive Heart Failure    Subjective        History of Present Illness  Lydia Willoughby presents to [facility] for follow up.  Lydia is a 68-year-old  female with past medical history outlined below, significant for diabetes, previous stroke, hypertension and hyperlipidemia who presents for routine follow-up.  She is doing very well.  She has been wheelchair-bound due to right-sided paralysis from her stroke for some time and has recently started walking about 50 feet at a time.  She denies any cardiac complaints.  She denies any chest pain or discomfort, dyspnea, orthopnea, edema or syncope she is compliant with her medications including her anticoagulation without any bleeding problems.    Doing very well  Needs an echo  Murmur  No swelling   Right sided paralysis from stroke      Past Medical History:   Diagnosis Date   • Congestive heart failure (CHF)    • Diabetes mellitus 09/10/2018   • Foot cramps    • Foot pain, left    • Foot pain, right    • Fracture    • Heart attack    • High blood cholesterol    • Hypertension    • Ingrowing toenail 09/10/2018   • Irregular heart beat    • Obesity    • Polyneuropathy    • Pressure ulcer, stage 1    • Stroke    • Tinea unguium    • Type 2 diabetes mellitus with foot ulcer    • Type 2 diabetes mellitus with polyneuropathy        ALLERGY  Allergies   Allergen Reactions   • Statins Unknown - High Severity   • Penicillins Rash        Past Surgical History:   Procedure Laterality Date   • CERVICAL DISC SURGERY     • EYE SURGERY      CATARACT   • TONSILLECTOMY     • TUBAL ABDOMINAL LIGATION          Social History     Socioeconomic History   • Marital status:    Tobacco Use   • Smoking status: Former   • Smokeless tobacco: Former   Vaping Use   • Vaping Use: Never used   Substance and Sexual Activity   • Alcohol use: Yes     Comment: light   • Drug use: Never   • Sexual  "activity: Defer       Family History   Problem Relation Age of Onset   • Diabetes Mother    • Cancer Father    • Stroke Brother    • Diabetes Brother    • Heart attack Other         Current Outpatient Medications on File Prior to Visit   Medication Sig   • acetaminophen (TYLENOL) 325 MG tablet Take 2 tablets by mouth Every 6 (Six) Hours As Needed for Mild Pain.   • Eliquis 5 MG tablet tablet Take 1 tablet by mouth 2 (two) times a day.   • ezetimibe (ZETIA) 10 MG tablet Take 1 tablet by mouth Daily.   • fenofibrate 160 MG tablet Take 1 tablet by mouth Daily.   • hydroCHLOROthiazide (HYDRODIURIL) 25 MG tablet Take 1 tablet by mouth Daily.   • levothyroxine (SYNTHROID, LEVOTHROID) 50 MCG tablet Take 1 tablet by mouth Daily.   • losartan (COZAAR) 25 MG tablet Daily.   • melatonin 5 MG tablet tablet Take 1 tablet by mouth.   • metFORMIN (GLUCOPHAGE) 1000 MG tablet 2 (Two) Times a Day With Meals.   • metoprolol succinate XL (TOPROL-XL) 50 MG 24 hr tablet Take 1 tablet by mouth Daily.   • nitroglycerin (NITRODUR) 0.2 MG/HR patch Place 1 patch on the skin as directed by provider See Admin Instructions. Apply patch daily, remove at night for at least 10 hours   • nystatin 243506 UNIT/GM powder    • Omega-3 300 MG capsule Take  by mouth.   • senna (Senokot) 8.6 MG tablet Take 1 tablet by mouth Daily.   • sertraline (ZOLOFT) 50 MG tablet Take 1 tablet by mouth Daily.   • spironolactone (ALDACTONE) 25 MG tablet Take 1 tablet by mouth Daily.     No current facility-administered medications on file prior to visit.       Objective   Vitals:    05/23/23 1146   BP: 101/58   Pulse: 57   Weight: 89.8 kg (198 lb)   Height: 170.2 cm (67\")       Physical Exam  Constitutional:       General: She is awake. She is not in acute distress.     Appearance: Normal appearance.   HENT:      Head: Normocephalic.      Nose: Nose normal. No congestion.   Eyes:      Extraocular Movements: Extraocular movements intact.      Conjunctiva/sclera: " Conjunctivae normal.      Pupils: Pupils are equal, round, and reactive to light.   Neck:      Thyroid: No thyromegaly.      Vascular: No JVD.   Cardiovascular:      Rate and Rhythm: Normal rate and regular rhythm.      Chest Wall: PMI is not displaced.      Pulses: Normal pulses.      Heart sounds: S1 normal and S2 normal. Murmur heard.     No friction rub. No gallop. No S3 or S4 sounds.   Pulmonary:      Effort: Pulmonary effort is normal.      Breath sounds: Normal breath sounds. No wheezing, rhonchi or rales.   Abdominal:      General: Bowel sounds are normal.      Palpations: Abdomen is soft.      Tenderness: There is no abdominal tenderness.   Musculoskeletal:      Cervical back: No tenderness.      Right lower leg: No edema.      Left lower leg: No edema.   Lymphadenopathy:      Cervical: No cervical adenopathy.   Skin:     General: Skin is warm and dry.      Capillary Refill: Capillary refill takes less than 2 seconds.      Coloration: Skin is not cyanotic.      Findings: No petechiae or rash.      Nails: There is no clubbing.   Neurological:      Mental Status: She is alert.   Psychiatric:         Mood and Affect: Mood normal.         Behavior: Behavior is cooperative.           Result Review     The following data was reviewed by CYNDY Jacobo on 05/25/23                 Assessment & Plan  Diagnoses and all orders for this visit:    1. Paroxysmal atrial fibrillation (Primary)    2. Benign essential HTN    3. Mixed dyslipidemia    4. Heart murmur    1. Atrial fibrillation, AFIBTYPE: persistent. Currently rate controlled. Continue beta blocker.  On chronic anticoagulation for stroke risk reduction, tolerating well, continue the same.  2. Hypertension is well controlled on current doses of antihypertensive medication. Continue current medications Dietary sodium restriction. Check blood pressures daily and record.   3.Lipid abnormalities are controlled . Target LDL for this patient is <70. Explained  to her the respective contributions of genetics, diet, and exercise to lipid levels and encouraged healthy diet and routine aerobic exercise. Continue current medications.  4.  She does have a systolic murmur.  We will check an echocardiogram to rule out structural or valvular abnormalities  Follow Up   No follow-ups on file.    Patient was given instructions and counseling regarding her condition or for health maintenance advice. Please see specific information pulled into the AVS if appropriate.     Shivani Galvin, CYNDY  05/25/23  12:01 EDT    Dictated Utilizing Dragon Dictation

## 2023-05-25 PROBLEM — E66.9 OBESITY (BMI 30.0-34.9): Status: ACTIVE | Noted: 2023-05-25

## 2023-05-25 PROBLEM — E66.811 OBESITY (BMI 30.0-34.9): Status: ACTIVE | Noted: 2023-05-25

## 2023-09-06 ENCOUNTER — TRANSCRIBE ORDERS (OUTPATIENT)
Dept: ADMINISTRATIVE | Facility: HOSPITAL | Age: 68
End: 2023-09-06
Payer: MEDICARE

## 2023-09-06 DIAGNOSIS — Z12.31 VISIT FOR SCREENING MAMMOGRAM: Primary | ICD-10-CM

## 2023-09-06 DIAGNOSIS — M81.0 OSTEOPOROSIS OF LUMBAR SPINE: Primary | ICD-10-CM

## 2023-09-12 ENCOUNTER — TELEPHONE (OUTPATIENT)
Dept: INTERNAL MEDICINE | Facility: CLINIC | Age: 68
End: 2023-09-12
Payer: MEDICARE

## 2023-09-12 DIAGNOSIS — M81.0 OSTEOPOROSIS OF LUMBAR SPINE: Primary | ICD-10-CM

## 2023-09-12 NOTE — TELEPHONE ENCOUNTER
Gela with the [facility] called asking that the order for the DEXA bone scan be signed. Making a new order that can be signed, since scheduling created the other one.

## 2023-09-15 ENCOUNTER — HOSPITAL ENCOUNTER (OUTPATIENT)
Dept: BONE DENSITY | Facility: HOSPITAL | Age: 68
Discharge: HOME | End: 2023-09-15
Payer: MEDICARE

## 2023-09-15 ENCOUNTER — HOSPITAL ENCOUNTER (OUTPATIENT)
Dept: MAMMOGRAPHY | Facility: HOSPITAL | Age: 68
Discharge: HOME | End: 2023-09-15
Payer: MEDICARE

## 2023-09-15 DIAGNOSIS — Z12.31 VISIT FOR SCREENING MAMMOGRAM: ICD-10-CM

## 2023-09-15 DIAGNOSIS — M81.0 OSTEOPOROSIS OF LUMBAR SPINE: ICD-10-CM

## 2023-09-15 PROCEDURE — 77063 BREAST TOMOSYNTHESIS BI: CPT

## 2023-09-15 PROCEDURE — 77067 SCR MAMMO BI INCL CAD: CPT

## 2023-09-15 PROCEDURE — 77080 DXA BONE DENSITY AXIAL: CPT

## 2023-09-18 ENCOUNTER — TELEPHONE (OUTPATIENT)
Dept: INTERNAL MEDICINE | Facility: CLINIC | Age: 68
End: 2023-09-18
Payer: MEDICARE

## 2023-09-18 NOTE — TELEPHONE ENCOUNTER
I spoke with the patient's brother or family member and then I spoke with our nursing home, regarding this issue,, the patient is not even my patient or under my care at the nursing home so it is unclear how the orders got placed under my name,---this was ordered by NP, I then spoke with Sandrine who is going to get in touch with the nurse practitioner we will can have to change the name on the ordering forms to the correct doctor and this will have to be followed up for a diagnostic mammogram and/or ultrasound and possibly a biopsy

## 2023-09-19 ENCOUNTER — TRANSCRIBE ORDERS (OUTPATIENT)
Dept: ADMINISTRATIVE | Facility: HOSPITAL | Age: 68
End: 2023-09-19
Payer: MEDICARE

## 2023-09-19 DIAGNOSIS — R92.8 ABNORMAL SCREENING MAMMOGRAM: Primary | ICD-10-CM

## 2023-09-29 ENCOUNTER — HOSPITAL ENCOUNTER (OUTPATIENT)
Dept: MAMMOGRAPHY | Facility: HOSPITAL | Age: 68
Discharge: HOME | End: 2023-09-29
Payer: MEDICARE

## 2023-09-29 ENCOUNTER — HOSPITAL ENCOUNTER (OUTPATIENT)
Dept: ULTRASOUND IMAGING | Facility: HOSPITAL | Age: 68
Discharge: HOME | End: 2023-09-29
Payer: MEDICARE

## 2023-09-29 DIAGNOSIS — R92.8 ABNORMAL SCREENING MAMMOGRAM: ICD-10-CM

## 2023-09-29 PROCEDURE — G0279 TOMOSYNTHESIS, MAMMO: HCPCS

## 2023-09-29 PROCEDURE — 77065 DX MAMMO INCL CAD UNI: CPT

## 2023-10-06 ENCOUNTER — TRANSCRIBE ORDERS (OUTPATIENT)
Dept: ADMINISTRATIVE | Facility: HOSPITAL | Age: 68
End: 2023-10-06
Payer: MEDICARE

## 2023-10-06 DIAGNOSIS — R92.8 ABNORMAL MAMMOGRAM: Primary | ICD-10-CM

## 2023-10-26 ENCOUNTER — TELEPHONE (OUTPATIENT)
Dept: SURGERY | Facility: CLINIC | Age: 68
End: 2023-10-26

## 2023-10-26 NOTE — TELEPHONE ENCOUNTER
CALLED AND INFORMED JAYLON AT [facility] THAT PT WAS A NO SHOW FOR APPT/PER JAYLON THEY WILL CALL BACK IF PT WANTS TO RS

## 2023-11-02 ENCOUNTER — OFFICE VISIT (OUTPATIENT)
Dept: CARDIOLOGY | Facility: CLINIC | Age: 68
End: 2023-11-02
Payer: MEDICARE

## 2023-11-02 VITALS
SYSTOLIC BLOOD PRESSURE: 116 MMHG | WEIGHT: 186.6 LBS | DIASTOLIC BLOOD PRESSURE: 69 MMHG | HEART RATE: 55 BPM | BODY MASS INDEX: 29.23 KG/M2

## 2023-11-02 DIAGNOSIS — E78.2 MIXED DYSLIPIDEMIA: ICD-10-CM

## 2023-11-02 DIAGNOSIS — I10 BENIGN ESSENTIAL HTN: ICD-10-CM

## 2023-11-02 DIAGNOSIS — I48.0 PAROXYSMAL ATRIAL FIBRILLATION: Primary | ICD-10-CM

## 2023-11-02 DIAGNOSIS — R01.1 HEART MURMUR: ICD-10-CM

## 2023-11-02 PROCEDURE — 3074F SYST BP LT 130 MM HG: CPT

## 2023-11-02 PROCEDURE — 3078F DIAST BP <80 MM HG: CPT

## 2023-11-02 PROCEDURE — 1160F RVW MEDS BY RX/DR IN RCRD: CPT

## 2023-11-02 PROCEDURE — 99214 OFFICE O/P EST MOD 30 MIN: CPT

## 2023-11-02 PROCEDURE — 1159F MED LIST DOCD IN RCRD: CPT

## 2023-11-02 RX ORDER — CHOLECALCIFEROL (VITAMIN D3) 125 MCG
2000 CAPSULE ORAL DAILY
COMMUNITY

## 2023-11-02 RX ORDER — BISACODYL 5 MG/1
5 TABLET, DELAYED RELEASE ORAL DAILY PRN
COMMUNITY

## 2023-11-02 RX ORDER — METOPROLOL SUCCINATE 25 MG/1
25 TABLET, EXTENDED RELEASE ORAL DAILY
Qty: 90 TABLET | Refills: 3 | Status: SHIPPED | OUTPATIENT
Start: 2023-11-02

## 2023-11-02 NOTE — PROGRESS NOTES
Chief Complaint  Hypertension, Hyperlipidemia, and Follow-up (4 mo f/u. )    Subjective        History of Present Illness  Lydia Willoughby presents to [facility] for follow up.   Lydia is a 68-year-old  female with past medical history outlined below, significant for diabetes, previous stroke, hypertension and hyperlipidemia who presents for routine follow-up.  She is doing very well.  She has been wheelchair-bound due to right-sided paralysis from her stroke for some time and has recently started walking about 50 feet at a time.  She denies any cardiac complaints.  She denies any chest pain or discomfort, dyspnea, orthopnea, edema or syncope she is compliant with her medications including her anticoagulation without any bleeding problems.       Past Medical History:   Diagnosis Date    Congestive heart failure (CHF)     Diabetes mellitus 09/10/2018    Foot cramps     Foot pain, left     Foot pain, right     Fracture     Heart attack     High blood cholesterol     Hypertension     Ingrowing toenail 09/10/2018    Irregular heart beat     Obesity     Polyneuropathy     Pressure ulcer, stage 1     Stroke     Tinea unguium     Type 2 diabetes mellitus with foot ulcer     Type 2 diabetes mellitus with polyneuropathy        ALLERGY  Allergies   Allergen Reactions    Statins Unknown - High Severity    Penicillins Rash        Past Surgical History:   Procedure Laterality Date    CERVICAL DISC SURGERY      EYE SURGERY      CATARACT    TONSILLECTOMY      TUBAL ABDOMINAL LIGATION          Social History     Socioeconomic History    Marital status:    Tobacco Use    Smoking status: Former    Smokeless tobacco: Former   Vaping Use    Vaping Use: Never used   Substance and Sexual Activity    Alcohol use: Yes     Comment: light    Drug use: Never    Sexual activity: Defer       Family History   Problem Relation Age of Onset    Diabetes Mother     Cancer Father     Stroke Brother      Diabetes Brother     Heart attack Other         Current Outpatient Medications on File Prior to Visit   Medication Sig    bisacodyl (DULCOLAX) 5 MG EC tablet Take 1 tablet by mouth Daily As Needed for Constipation.    Cholecalciferol (Vitamin D3) 50 MCG (2000 UT) tablet Take 1 tablet by mouth Daily.    Eliquis 5 MG tablet tablet Take 1 tablet by mouth 2 (two) times a day.    ezetimibe (ZETIA) 10 MG tablet Take 1 tablet by mouth Daily.    fenofibrate 160 MG tablet Take 1 tablet by mouth Daily.    levothyroxine (SYNTHROID, LEVOTHROID) 50 MCG tablet Take 1 tablet by mouth Daily.    melatonin 5 MG tablet tablet Take 2 tablets by mouth At Night As Needed.    metFORMIN (GLUCOPHAGE) 1000 MG tablet Take 1 tablet by mouth 2 (Two) Times a Day With Meals.    nitroglycerin (NITRODUR) 0.2 MG/HR patch Place 1 patch on the skin as directed by provider See Admin Instructions. Apply patch daily, remove at night for at least 10 hours    nystatin [id number] UNIT/GM powder     Omega-3 300 MG capsule Take  by mouth.    senna (Senokot) 8.6 MG tablet Take 1 tablet by mouth Daily.    sertraline (ZOLOFT) 50 MG tablet Take 0.5 tablets by mouth Daily.    spironolactone (ALDACTONE) 25 MG tablet Take 1 tablet by mouth Daily.    [DISCONTINUED] metoprolol succinate XL (TOPROL-XL) 50 MG 24 hr tablet Take 1 tablet by mouth Daily.    [DISCONTINUED] acetaminophen (TYLENOL) 325 MG tablet Take 2 tablets by mouth Every 6 (Six) Hours As Needed for Mild Pain. (Patient not taking: Reported on 11/2/2023)    [DISCONTINUED] hydroCHLOROthiazide (HYDRODIURIL) 25 MG tablet Take 1 tablet by mouth Daily. (Patient not taking: Reported on 11/2/2023)    [DISCONTINUED] losartan (COZAAR) 25 MG tablet Daily. (Patient not taking: Reported on 11/2/2023)     No current facility-administered medications on file prior to visit.       Objective   Vitals:    11/02/23 0936   BP: 116/69   Pulse: 55   Weight: 84.6 kg (186 lb 9.6 oz)       Physical Exam  Constitutional:       General:  She is awake. She is not in acute distress.     Appearance: Normal appearance.   HENT:      Head: Normocephalic.      Nose: Nose normal. No congestion.   Eyes:      Extraocular Movements: Extraocular movements intact.      Conjunctiva/sclera: Conjunctivae normal.      Pupils: Pupils are equal, round, and reactive to light.   Neck:      Thyroid: No thyromegaly.      Vascular: No JVD.   Cardiovascular:      Rate and Rhythm: Normal rate and regular rhythm.      Chest Wall: PMI is not displaced.      Pulses: Normal pulses.      Heart sounds: S1 normal and S2 normal. Murmur heard.      No friction rub. No gallop. No S3 or S4 sounds.   Pulmonary:      Effort: Pulmonary effort is normal.      Breath sounds: Normal breath sounds. No wheezing, rhonchi or rales.   Abdominal:      General: Bowel sounds are normal.      Palpations: Abdomen is soft.      Tenderness: There is no abdominal tenderness.   Musculoskeletal:      Cervical back: No tenderness.      Right lower leg: No edema.      Left lower leg: No edema.   Lymphadenopathy:      Cervical: No cervical adenopathy.   Skin:     General: Skin is warm and dry.      Capillary Refill: Capillary refill takes less than 2 seconds.      Coloration: Skin is not cyanotic.      Findings: No petechiae or rash.      Nails: There is no clubbing.   Neurological:      Mental Status: She is alert.   Psychiatric:         Mood and Affect: Mood normal.         Behavior: Behavior is cooperative.           Result Review     The following data was reviewed by CYNDY Jacobo on 11/02/23.                 Procedures    Assessment & Plan  Diagnoses and all orders for this visit:    1. Paroxysmal atrial fibrillation (Primary)    2. Mixed dyslipidemia  -     Lipid Panel; Future    3. Heart murmur  -     Adult Transthoracic Echo Complete w/ Color, Spectral and Contrast if necessary per protocol; Future    4. Benign essential HTN    Other orders  -     metoprolol succinate XL (TOPROL-XL) 25 MG  24 hr tablet; Take 1 tablet by mouth Daily.  Dispense: 90 tablet; Refill: 3      1.  Persistent atrial fibrillation, currently rate controlled.  Continue metoprolol.  Heart rate is in the 50s.  Metoprolol dose will be decreased to 25 mg daily.  On chronic anticoagulation for stroke risk reduction, tolerating well.  Continue the same.  She notes no bleeding issues or concerns.  2.  Unknown control.  We will repeat a lipid panel.  She has intolerance to statin therapy.  3.  She does have a systolic on exam.  We will check an echocardiogram to rule out structural or valvular abnormalities.  4.  Blood pressure is well controlled on current antihypertensive regimen.  Continue the same.  Note she has discontinued hydrochlorothiazide and losartan but remains with good blood pressure control.                Follow Up   Return in about 6 months (around 5/2/2024) for With .    Patient was given instructions and counseling regarding her condition or for health maintenance advice. Please see specific information pulled into the AVS if appropriate.     Shivani Galvin, CYNDY  11/02/23  09:36 EDT    Dictated Utilizing Dragon Dictation

## 2023-11-17 ENCOUNTER — HOSPITAL ENCOUNTER (OUTPATIENT)
Dept: CARDIOLOGY | Facility: HOSPITAL | Age: 68
Discharge: HOME | End: 2023-11-17
Payer: MEDICARE

## 2023-11-17 DIAGNOSIS — R01.1 HEART MURMUR: ICD-10-CM

## 2023-11-17 PROCEDURE — 93306 TTE W/DOPPLER COMPLETE: CPT

## 2023-11-20 LAB
BH CV ECHO MEAS - AO MAX PG: 8 MMHG
BH CV ECHO MEAS - AO MEAN PG: 4 MMHG
BH CV ECHO MEAS - AO ROOT DIAM: 3.2 CM
BH CV ECHO MEAS - AO V2 MAX: 144 CM/SEC
BH CV ECHO MEAS - AO V2 VTI: 38 CM
BH CV ECHO MEAS - AVA(I,D): 1.84 CM2
BH CV ECHO MEAS - EDV(CUBED): 79.5 ML
BH CV ECHO MEAS - EDV(MOD-SP2): 85.2 ML
BH CV ECHO MEAS - EDV(MOD-SP4): 84.5 ML
BH CV ECHO MEAS - EF(MOD-BP): 63.2 %
BH CV ECHO MEAS - EF(MOD-SP2): 69.1 %
BH CV ECHO MEAS - EF(MOD-SP4): 60.2 %
BH CV ECHO MEAS - ESV(CUBED): 22 ML
BH CV ECHO MEAS - ESV(MOD-SP2): 26.3 ML
BH CV ECHO MEAS - ESV(MOD-SP4): 33.6 ML
BH CV ECHO MEAS - FS: 34.9 %
BH CV ECHO MEAS - IVS/LVPW: 1.42 CM
BH CV ECHO MEAS - IVSD: 1.7 CM
BH CV ECHO MEAS - LA DIMENSION: 3 CM
BH CV ECHO MEAS - LAT PEAK E' VEL: 7.6 CM/SEC
BH CV ECHO MEAS - LV MASS(C)D: 245 GRAMS
BH CV ECHO MEAS - LV MAX PG: 3.1 MMHG
BH CV ECHO MEAS - LV MEAN PG: 2 MMHG
BH CV ECHO MEAS - LV V1 MAX: 88 CM/SEC
BH CV ECHO MEAS - LV V1 VTI: 22.2 CM
BH CV ECHO MEAS - LVIDD: 4.3 CM
BH CV ECHO MEAS - LVIDS: 2.8 CM
BH CV ECHO MEAS - LVOT AREA: 3.1 CM2
BH CV ECHO MEAS - LVOT DIAM: 2 CM
BH CV ECHO MEAS - LVPWD: 1.2 CM
BH CV ECHO MEAS - MED PEAK E' VEL: 6.5 CM/SEC
BH CV ECHO MEAS - MV A MAX VEL: 70.6 CM/SEC
BH CV ECHO MEAS - MV DEC SLOPE: 504 CM/SEC2
BH CV ECHO MEAS - MV DEC TIME: 0.22 SEC
BH CV ECHO MEAS - MV E MAX VEL: 74.9 CM/SEC
BH CV ECHO MEAS - MV E/A: 1.06
BH CV ECHO MEAS - MV P1/2T: 47.5 MSEC
BH CV ECHO MEAS - MVA(P1/2T): 4.6 CM2
BH CV ECHO MEAS - RVDD: 3 CM
BH CV ECHO MEAS - SV(LVOT): 69.7 ML
BH CV ECHO MEAS - SV(MOD-SP2): 58.9 ML
BH CV ECHO MEAS - SV(MOD-SP4): 50.9 ML
BH CV ECHO MEASUREMENTS AVERAGE E/E' RATIO: 10.62
IVRT: 69 MS
LEFT ATRIUM VOLUME INDEX: 20.4 ML/M2
LEFT ATRIUM VOLUME: 40 ML

## 2023-11-22 ENCOUNTER — TELEPHONE (OUTPATIENT)
Dept: CARDIOLOGY | Facility: CLINIC | Age: 68
End: 2023-11-22
Payer: MEDICARE

## 2023-11-22 NOTE — TELEPHONE ENCOUNTER
----- Message from CYNDY Rodriguez sent at 11/21/2023  3:56 PM EST -----  Patient's echo demonstrated an ejection fraction of 56 to 60%.  There is mild aortic insufficiency, no aortic stenosis.

## 2023-11-22 NOTE — TELEPHONE ENCOUNTER
Patient is a resident of facility [facility]. The home number is the number for the facility- will need to ask for nurse to speak with patient.     SW patient. Went over results with patient. Patient verbalized understanding and appreciation.

## 2024-01-02 ENCOUNTER — TRANSCRIBE ORDERS (OUTPATIENT)
Dept: ADMINISTRATIVE | Facility: HOSPITAL | Age: 69
End: 2024-01-02
Payer: MEDICARE

## 2024-01-02 DIAGNOSIS — R59.1 LYMPHADENOPATHY: Primary | ICD-10-CM

## 2024-01-08 ENCOUNTER — HOSPITAL ENCOUNTER (OUTPATIENT)
Dept: CT IMAGING | Facility: HOSPITAL | Age: 69
Discharge: HOME | End: 2024-01-08
Admitting: INTERNAL MEDICINE
Payer: MEDICARE

## 2024-01-08 DIAGNOSIS — R59.1 LYMPHADENOPATHY: ICD-10-CM

## 2024-01-08 PROCEDURE — 70490 CT SOFT TISSUE NECK W/O DYE: CPT

## 2024-04-11 ENCOUNTER — HOSPITAL ENCOUNTER (OUTPATIENT)
Dept: MAMMOGRAPHY | Facility: HOSPITAL | Age: 69
Discharge: HOME | End: 2024-04-11
Payer: MEDICARE

## 2024-04-11 DIAGNOSIS — R92.8 ABNORMAL MAMMOGRAM: ICD-10-CM

## 2024-04-11 PROCEDURE — G0279 TOMOSYNTHESIS, MAMMO: HCPCS

## 2024-04-11 PROCEDURE — 77065 DX MAMMO INCL CAD UNI: CPT

## 2024-04-18 ENCOUNTER — TELEPHONE (OUTPATIENT)
Dept: GASTROENTEROLOGY | Facility: CLINIC | Age: 69
End: 2024-04-18

## 2024-04-18 NOTE — TELEPHONE ENCOUNTER
Per [address] patient was taken to wrong office. [facility]ISHA states they can not take patient to other location.   Appointment to be canceled and facility will reschedule

## 2024-05-02 ENCOUNTER — OFFICE VISIT (OUTPATIENT)
Dept: CARDIOLOGY | Facility: CLINIC | Age: 69
End: 2024-05-02
Payer: MEDICARE

## 2024-05-02 VITALS
SYSTOLIC BLOOD PRESSURE: 98 MMHG | HEART RATE: 65 BPM | WEIGHT: 182.6 LBS | DIASTOLIC BLOOD PRESSURE: 58 MMHG | HEIGHT: 67 IN | BODY MASS INDEX: 28.66 KG/M2

## 2024-05-02 DIAGNOSIS — R01.1 HEART MURMUR: ICD-10-CM

## 2024-05-02 DIAGNOSIS — I10 BENIGN ESSENTIAL HTN: Primary | ICD-10-CM

## 2024-05-02 DIAGNOSIS — E78.2 MIXED DYSLIPIDEMIA: ICD-10-CM

## 2024-05-02 DIAGNOSIS — I48.0 PAROXYSMAL ATRIAL FIBRILLATION: ICD-10-CM

## 2024-05-02 PROCEDURE — 99214 OFFICE O/P EST MOD 30 MIN: CPT

## 2024-05-02 PROCEDURE — 1160F RVW MEDS BY RX/DR IN RCRD: CPT

## 2024-05-02 PROCEDURE — 3074F SYST BP LT 130 MM HG: CPT

## 2024-05-02 PROCEDURE — 3078F DIAST BP <80 MM HG: CPT

## 2024-05-02 PROCEDURE — 1159F MED LIST DOCD IN RCRD: CPT

## 2024-05-02 RX ORDER — ACETAMINOPHEN 325 MG/1
650 TABLET ORAL EVERY 6 HOURS PRN
COMMUNITY

## 2024-05-02 RX ORDER — LANOLIN ALCOHOL/MO/W.PET/CERES
1000 CREAM (GRAM) TOPICAL
COMMUNITY

## 2024-05-02 NOTE — PROGRESS NOTES
Chief Complaint  Hyperlipidemia, Hypertension, and Follow-up (6 mo f/u.  Pt's bp is low this morning and she has not had her meds this morning.  Pt's med tech is present, who cares for her 5 days a week, states her bp normally is around 130's to 140's.  Low bp is not normal for pt. )    Subjective        History of Present Illness  Lydia Willoughby presents to [facility] for follow up.  Patient is a 69-year-old female with past medical history outlined below, significant for diabetes, previous stroke, hypertension, A-fib, aortic valve regurgitation and hyperlipidemia who presents for routine follow-up.  She is doing well from a cardiac standpoint.  She has been wheelchair-bound due to right-sided paralysis from her stroke for some time and has recently started walking about 50 feet at a time.  She denies any cardiac complaints.  She has no chest pain or discomfort, dyspnea, orthopnea, edema or syncope.  She is compliant with her medications including her anticoagulation and has no bleeding issues on it.      Past Medical History:   Diagnosis Date    Congestive heart failure (CHF)     Diabetes mellitus 09/10/2018    Foot cramps     Foot pain, left     Foot pain, right     Fracture     Heart attack     High blood cholesterol     Hypertension     Ingrowing toenail 09/10/2018    Irregular heart beat     Obesity     Polyneuropathy     Pressure ulcer, stage 1     Stroke     Tinea unguium     Type 2 diabetes mellitus with foot ulcer     Type 2 diabetes mellitus with polyneuropathy        ALLERGY  Allergies   Allergen Reactions    Statins Unknown - High Severity    Penicillins Rash        Past Surgical History:   Procedure Laterality Date    CERVICAL DISC SURGERY      EYE SURGERY      CATARACT    TONSILLECTOMY      TUBAL ABDOMINAL LIGATION          Social History     Socioeconomic History    Marital status:    Tobacco Use    Smoking status: Former    Smokeless tobacco: Former   Vaping Use     Vaping status: Never Used   Substance and Sexual Activity    Alcohol use: Yes     Comment: light    Drug use: Never    Sexual activity: Defer       Family History   Problem Relation Age of Onset    Diabetes Mother     Cancer Father     Stroke Brother     Diabetes Brother     Heart attack Other         Current Outpatient Medications on File Prior to Visit   Medication Sig    acetaminophen (TYLENOL) 325 MG tablet Take 2 tablets by mouth Every 6 (Six) Hours As Needed for Mild Pain.    bisacodyl (DULCOLAX) 5 MG EC tablet Take 1 tablet by mouth Daily As Needed for Constipation.    Cholecalciferol (Vitamin D3) 50 MCG (2000 UT) tablet Take 1 tablet by mouth Every 30 (Thirty) Days.    Eliquis 5 MG tablet tablet Take 1 tablet by mouth 2 (two) times a day.    ezetimibe (ZETIA) 10 MG tablet Take 1 tablet by mouth Daily.    fenofibrate 160 MG tablet Take 1 tablet by mouth Daily.    levothyroxine (SYNTHROID, LEVOTHROID) 50 MCG tablet Take 1 tablet by mouth Daily.    melatonin 5 MG tablet tablet Take 2 tablets by mouth At Night As Needed.    metFORMIN (GLUCOPHAGE) 1000 MG tablet Take 1 tablet by mouth 2 (Two) Times a Day With Meals.    metoprolol succinate XL (TOPROL-XL) 25 MG 24 hr tablet Take 1 tablet by mouth Daily. (Patient taking differently: Take 0.5 tablets by mouth Daily. Hold tablet if pulse is less than 50)    nitroglycerin (NITRODUR) 0.2 MG/HR patch Place 1 patch on the skin as directed by provider See Admin Instructions. Apply patch daily, remove at night for at least 10 hours    Omega-3 300 MG capsule Take  by mouth.    senna (Senokot) 8.6 MG tablet Take 1 tablet by mouth Daily.    sertraline (ZOLOFT) 50 MG tablet Take 0.5 tablets by mouth Daily.    spironolactone (ALDACTONE) 25 MG tablet Take 0.5 tablets by mouth Daily. Hold tablet if SBP is less than 90, DBP is less than 60    vitamin B-12 (CYANOCOBALAMIN) 1000 MCG tablet Take 1 tablet by mouth Every 30 (Thirty) Days. Injection    [DISCONTINUED] nystatin [id number]  "UNIT/GM powder  (Patient not taking: Reported on 5/2/2024)     No current facility-administered medications on file prior to visit.       Objective   Vitals:    05/02/24 1004   BP: 98/58   Pulse: 65   Weight: 82.8 kg (182 lb 9.6 oz)  Comment: This was a weight from last office visit with Cardio - [place]   Height: 170.2 cm (67\")       Physical Exam  Constitutional:       General: She is awake. She is not in acute distress.     Appearance: Normal appearance.   HENT:      Head: Normocephalic.      Nose: Nose normal. No congestion.   Eyes:      Extraocular Movements: Extraocular movements intact.      Conjunctiva/sclera: Conjunctivae normal.      Pupils: Pupils are equal, round, and reactive to light.   Neck:      Thyroid: No thyromegaly.      Vascular: No JVD.   Cardiovascular:      Rate and Rhythm: Normal rate and regular rhythm.      Chest Wall: PMI is not displaced.      Pulses: Normal pulses.      Heart sounds: S1 normal and S2 normal. Murmur heard.      No friction rub. No gallop. No S3 or S4 sounds.   Pulmonary:      Effort: Pulmonary effort is normal.      Breath sounds: Normal breath sounds. No wheezing, rhonchi or rales.   Abdominal:      General: Bowel sounds are normal.      Palpations: Abdomen is soft.      Tenderness: There is no abdominal tenderness.   Musculoskeletal:      Cervical back: No tenderness.      Right lower leg: No edema.      Left lower leg: No edema.   Lymphadenopathy:      Cervical: No cervical adenopathy.   Skin:     General: Skin is warm and dry.      Capillary Refill: Capillary refill takes less than 2 seconds.      Coloration: Skin is not cyanotic.      Findings: No petechiae or rash.      Nails: There is no clubbing.   Neurological:      Mental Status: She is alert.   Psychiatric:         Mood and Affect: Mood normal.         Behavior: Behavior is cooperative.           Result Review     The following data was reviewed by CYNDY Jacobo on 05/02/24.               Results for " orders placed during the hospital encounter of 11/17/23    Adult Transthoracic Echo Complete w/ Color, Spectral and Contrast if necessary per protocol    Interpretation Summary    Technically difficult study.    Left ventricular ejection fraction appears to be 56 - 60%.    Left ventricular wall thickness is consistent with septal asymmetric hypertrophy.    Left ventricular diastolic function was indeterminate.    Mild aortic insufficiency.  No significant aortic stenosis with max/mean pressure gradient 8/4 mmHg.      No results found for this or any previous visit.          Procedures    Assessment & Plan  Diagnoses and all orders for this visit:    1. Benign essential HTN (Primary)    2. Paroxysmal atrial fibrillation    3. Mixed dyslipidemia    4. Heart murmur      1.  Stable on current regimen.  Continue the same.  2.Atrial fibrillation, AFIBTYPE: persistent. Currently rate controlled. Continue beta blocker.  On chronic anticoagulation for stroke risk reduction, tolerating well, continue the same.  3.Lipid abnormalities are controlled.  Patient is intolerant to statins.  4.  Mild aortic insufficiency on most recent echocardiogram.  She does have a heart murmur on exam.  Will continue to monitor clinically.      The medical services provided during this encounter are part of ongoing care related to this patient's single serious condition or complex condition.      Follow Up   Return in about 6 months (around 11/2/2024).    Patient was given instructions and counseling regarding her condition or for health maintenance advice. Please see specific information pulled into the AVS if appropriate.     Shivani Galvin, CYNDY  05/02/24  10:26 EDT    Dictated Utilizing Dragon Dictation

## 2024-05-10 ENCOUNTER — TELEPHONE (OUTPATIENT)
Dept: CARDIOLOGY | Facility: CLINIC | Age: 69
End: 2024-05-10
Payer: MEDICARE

## 2024-05-10 NOTE — TELEPHONE ENCOUNTER
RN from [facility] reported patient had an episode of CP, with near syncopal episode yesterday- Patient's vital signs increased greatly during the event. EKG was preformed, have asked they fax to our office. Provided fax number [phone].     RN states after the episode patients vital signs returned to normal.     Please advise.    Apixaban/Eliquis - Compliance/Apixaban/Eliquis - Dietary Advice/Apixaban/Eliquis - Follow up monitoring/Apixaban/Eliquis - Potential for adverse drug reactions and interactions

## 2024-05-17 NOTE — TELEPHONE ENCOUNTER
[facility]. Informed them we have not received the EKG from 5/10 episode of CP. Provided call back number and fax number.

## 2024-05-28 ENCOUNTER — OFFICE VISIT (OUTPATIENT)
Dept: CARDIOLOGY | Facility: CLINIC | Age: 69
End: 2024-05-28
Payer: MEDICARE

## 2024-05-28 VITALS
SYSTOLIC BLOOD PRESSURE: 114 MMHG | DIASTOLIC BLOOD PRESSURE: 64 MMHG | HEART RATE: 55 BPM | HEIGHT: 67 IN | BODY MASS INDEX: 27.94 KG/M2 | WEIGHT: 178 LBS

## 2024-05-28 DIAGNOSIS — I10 BENIGN ESSENTIAL HTN: Primary | ICD-10-CM

## 2024-05-28 DIAGNOSIS — E78.2 MIXED DYSLIPIDEMIA: ICD-10-CM

## 2024-05-28 DIAGNOSIS — I48.0 PAROXYSMAL ATRIAL FIBRILLATION: ICD-10-CM

## 2024-05-28 DIAGNOSIS — R01.1 HEART MURMUR: ICD-10-CM

## 2024-05-28 PROCEDURE — 99214 OFFICE O/P EST MOD 30 MIN: CPT

## 2024-05-28 PROCEDURE — 3074F SYST BP LT 130 MM HG: CPT

## 2024-05-28 PROCEDURE — 1159F MED LIST DOCD IN RCRD: CPT

## 2024-05-28 PROCEDURE — 1160F RVW MEDS BY RX/DR IN RCRD: CPT

## 2024-05-28 PROCEDURE — 3078F DIAST BP <80 MM HG: CPT

## 2024-05-28 PROCEDURE — G2211 COMPLEX E/M VISIT ADD ON: HCPCS

## 2024-05-28 RX ORDER — BUSPIRONE HYDROCHLORIDE 5 MG/1
5 TABLET ORAL 2 TIMES DAILY
COMMUNITY
Start: 2024-05-21

## 2024-05-28 NOTE — PROGRESS NOTES
Chief Complaint  Hypertension, Hyperlipidemia, and Follow-up (F/U.  Pt is having chest tingling and spells of passing out. Been going on for a year.  )    Subjective        History of Present Illness  Lydia Willoughby presents to [facility] CARDIOLOGY for follow up.   History of Present Illness  Patient is a 69-year-old female with past medical history outlined below, significant for diabetes, previous stroke, hypertension, A-fib, aortic valve regurgitation and hyperlipidemia who presents with chief complaint of dizziness and syncope.  However when she is asked if she has been passing out she denies this.  She specifically denies any dizziness or lightheadedness.  She reports that she just feels unwell when she first wakes up in the morning.  She specifically denied chest pain or discomfort, dyspnea, edema.  She is compliant with her medications including her anticoagulation and denies any bleeding issues.  She is somewhat confused in the office today.  She does not know the year, the president, what office she is at but does know her name and date of birth.      Past Medical History:   Diagnosis Date    Congestive heart failure (CHF)     Diabetes mellitus 09/10/2018    Foot cramps     Foot pain, left     Foot pain, right     Fracture     Heart attack     High blood cholesterol     Hypertension     Ingrowing toenail 09/10/2018    Irregular heart beat     Obesity     Polyneuropathy     Pressure ulcer, stage 1     Stroke     Tinea unguium     Type 2 diabetes mellitus with foot ulcer     Type 2 diabetes mellitus with polyneuropathy        ALLERGY  Allergies   Allergen Reactions    Statins Unknown - High Severity    Penicillins Rash        Past Surgical History:   Procedure Laterality Date    CERVICAL DISC SURGERY      EYE SURGERY      CATARACT    TONSILLECTOMY      TUBAL ABDOMINAL LIGATION          Social History     Socioeconomic History    Marital status:    Tobacco Use    Smoking status:  Former    Smokeless tobacco: Former   Vaping Use    Vaping status: Never Used   Substance and Sexual Activity    Alcohol use: Yes     Comment: light    Drug use: Never    Sexual activity: Defer       Family History   Problem Relation Age of Onset    Diabetes Mother     Cancer Father     Stroke Brother     Diabetes Brother     Heart attack Other         Current Outpatient Medications on File Prior to Visit   Medication Sig    acetaminophen (TYLENOL) 325 MG tablet Take 2 tablets by mouth Every 6 (Six) Hours As Needed for Mild Pain.    bisacodyl (DULCOLAX) 5 MG EC tablet Take 1 tablet by mouth Daily As Needed for Constipation.    busPIRone (BUSPAR) 5 MG tablet Take 1 tablet by mouth 2 (Two) Times a Day.    Cholecalciferol (Vitamin D3) 50 MCG (2000 UT) tablet Take 1 tablet by mouth Every 30 (Thirty) Days.    Eliquis 5 MG tablet tablet Take 1 tablet by mouth 2 (two) times a day.    ezetimibe (ZETIA) 10 MG tablet Take 1 tablet by mouth Daily.    fenofibrate 160 MG tablet Take 1 tablet by mouth Daily.    levothyroxine (SYNTHROID, LEVOTHROID) 50 MCG tablet Take 1 tablet by mouth Daily.    melatonin 5 MG tablet tablet Take 2 tablets by mouth At Night As Needed.    metFORMIN (GLUCOPHAGE) 850 MG tablet Take 1 tablet by mouth 2 (Two) Times a Day With Meals.    metoprolol succinate XL (TOPROL-XL) 25 MG 24 hr tablet Take 1 tablet by mouth Daily. (Patient taking differently: Take 0.5 tablets by mouth Daily. Hold tablet if pulse is less than 50)    nitroglycerin (NITRODUR) 0.2 MG/HR patch Place 1 patch on the skin as directed by provider See Admin Instructions. Apply patch daily, remove at night for at least 10 hours    Omega-3 300 MG capsule Take  by mouth.    senna (Senokot) 8.6 MG tablet Take 1 tablet by mouth Daily.    sertraline (ZOLOFT) 50 MG tablet Take 0.5 tablets by mouth Daily.    vitamin B-12 (CYANOCOBALAMIN) 1000 MCG tablet Take 1 tablet by mouth Every 30 (Thirty) Days. Injection    [DISCONTINUED] spironolactone  "(ALDACTONE) 25 MG tablet Take 0.5 tablets by mouth Daily. Hold tablet if SBP is less than 90, DBP is less than 60    [DISCONTINUED] metFORMIN (GLUCOPHAGE) 1000 MG tablet Take 1 tablet by mouth 2 (Two) Times a Day With Meals. (Patient not taking: Reported on 5/28/2024)     No current facility-administered medications on file prior to visit.       Objective   Vitals:    05/28/24 1446   BP: 114/64   Pulse: 55   Weight: 80.7 kg (178 lb)   Height: 170.2 cm (67\")       Physical Exam  Constitutional:       General: She is awake. She is not in acute distress.     Appearance: Normal appearance.   HENT:      Head: Normocephalic.      Nose: Nose normal. No congestion.   Eyes:      Extraocular Movements: Extraocular movements intact.      Conjunctiva/sclera: Conjunctivae normal.      Pupils: Pupils are equal, round, and reactive to light.   Neck:      Thyroid: No thyromegaly.      Vascular: No JVD.   Cardiovascular:      Rate and Rhythm: Normal rate and regular rhythm.      Chest Wall: PMI is not displaced.      Pulses: Normal pulses.      Heart sounds: S1 normal and S2 normal. Murmur heard.      No friction rub. No gallop. No S3 or S4 sounds.   Pulmonary:      Effort: Pulmonary effort is normal.      Breath sounds: Normal breath sounds. No wheezing, rhonchi or rales.   Abdominal:      General: Bowel sounds are normal.      Palpations: Abdomen is soft.      Tenderness: There is no abdominal tenderness.   Musculoskeletal:      Cervical back: No tenderness.      Right lower leg: No edema.      Left lower leg: No edema.   Lymphadenopathy:      Cervical: No cervical adenopathy.   Skin:     General: Skin is warm and dry.      Capillary Refill: Capillary refill takes less than 2 seconds.      Coloration: Skin is not cyanotic.      Findings: No petechiae or rash.      Nails: There is no clubbing.   Neurological:      Mental Status: She is alert.   Psychiatric:         Mood and Affect: Mood normal.         Behavior: Behavior is " cooperative.           Result Review     The following data was reviewed by CYNDY Jacobo on 05/28/24.               Results for orders placed during the hospital encounter of 11/17/23    Adult Transthoracic Echo Complete w/ Color, Spectral and Contrast if necessary per protocol    Interpretation Summary    Technically difficult study.    Left ventricular ejection fraction appears to be 56 - 60%.    Left ventricular wall thickness is consistent with septal asymmetric hypertrophy.    Left ventricular diastolic function was indeterminate.    Mild aortic insufficiency.  No significant aortic stenosis with max/mean pressure gradient 8/4 mmHg.      No results found for this or any previous visit.          Procedures    Assessment & Plan  Diagnoses and all orders for this visit:    1. Benign essential HTN (Primary)    2. Paroxysmal atrial fibrillation    3. Mixed dyslipidemia    4. Heart murmur      Assessment & Plan      1.  Patient's blood pressure is running on the low side.  Will discontinue spironolactone as this may be contributing to her overall feeling of unwell.  2.Atrial fibrillation, AFIBTYPE: persistent. Currently rate controlled. Continue beta blocker.  On chronic anticoagulation for stroke risk reduction, tolerating well, continue the same.  3.Lipid abnormalities are controlled. Target LDL for this patient is <70. Explained to her the respective contributions of genetics, diet, and exercise to lipid levels and encouraged healthy diet and routine aerobic exercise. Continue current medications.  4.  Mild aortic insufficiency on most recent echocardiogram.  She does have a heart murmur on exam.  Will continue to monitor clinically.        The medical services provided during this encounter are part of ongoing care related to this patient's single serious condition or complex condition.    Follow Up   Return in about 6 months (around 11/28/2024) for With CYNDY Vyas.    Patient was given  instructions and counseling regarding her condition or for health maintenance advice. Please see specific information pulled into the AVS if appropriate.         Shivani Galvin, APRN  05/28/24  15:19 EDT    Dictated Utilizing Dragon Dictation

## 2024-09-24 ENCOUNTER — TELEPHONE (OUTPATIENT)
Dept: GASTROENTEROLOGY | Facility: CLINIC | Age: 69
End: 2024-09-24

## 2024-09-26 ENCOUNTER — TELEPHONE (OUTPATIENT)
Dept: GASTROENTEROLOGY | Facility: CLINIC | Age: 69
End: 2024-09-26
Payer: MEDICARE

## 2024-10-02 ENCOUNTER — TELEPHONE (OUTPATIENT)
Dept: GASTROENTEROLOGY | Facility: CLINIC | Age: 69
End: 2024-10-02
Payer: MEDICARE

## 2024-10-02 ENCOUNTER — OFFICE VISIT (OUTPATIENT)
Dept: GASTROENTEROLOGY | Facility: CLINIC | Age: 69
End: 2024-10-02
Payer: MEDICARE

## 2024-10-02 VITALS
SYSTOLIC BLOOD PRESSURE: 128 MMHG | DIASTOLIC BLOOD PRESSURE: 49 MMHG | BODY MASS INDEX: 29.84 KG/M2 | HEART RATE: 52 BPM | HEIGHT: 67 IN | OXYGEN SATURATION: 100 % | WEIGHT: 190.1 LBS

## 2024-10-02 DIAGNOSIS — Z12.11 ENCOUNTER FOR SCREENING FOR MALIGNANT NEOPLASM OF COLON: ICD-10-CM

## 2024-10-02 DIAGNOSIS — R13.10 DYSPHAGIA, UNSPECIFIED TYPE: Primary | ICD-10-CM

## 2024-10-02 RX ORDER — LISINOPRIL 5 MG/1
TABLET ORAL
COMMUNITY
Start: 2024-09-25

## 2024-10-02 RX ORDER — NYSTATIN TOPICAL POWDER 100000 U/G
POWDER TOPICAL
COMMUNITY
Start: 2024-07-20

## 2024-10-02 RX ORDER — POLYETHYLENE GLYCOL 3350, SODIUM SULFATE, SODIUM CHLORIDE, POTASSIUM CHLORIDE, ASCORBIC ACID, SODIUM ASCORBATE 140-9-5.2G
1 KIT ORAL TAKE AS DIRECTED
Qty: 1 EACH | Refills: 0 | Status: SHIPPED | OUTPATIENT
Start: 2024-10-02 | End: 2024-10-03

## 2024-10-02 NOTE — H&P (VIEW-ONLY)
Chief Complaint  Difficulty Swallowing (Solids and liquids )    Lydia Willoughby is a 69 y.o. female who presents to [facility]OLOG[facility]- [facility] on referral from No ref. provider found for a gastroenterology evaluation of dysphagia.      History of Present Illness  New patient presents to the office for dysphagia. Patient was previously having intermittent dysphagia but this has not occurred in several months. Denies heartburn, nausea, vomiting, epigastric pain, and dysphagia. No lower GI complaints. Denies family history of colon cancer.     FL Video Swallow Single Contrast 11/01/2019 - No evidence for aspiration or penetration.  See speech pathology note for additional clinical recommendations.      Past Medical History:   Diagnosis Date    Congestive heart failure (CHF)     Diabetes mellitus 09/10/2018    Foot cramps     Foot pain, left     Foot pain, right     Fracture     Heart attack     High blood cholesterol     Hypertension     Ingrowing toenail 09/10/2018    Irregular heart beat     Obesity     Polyneuropathy     Pressure ulcer, stage 1     Stroke     Tinea unguium     Type 2 diabetes mellitus with foot ulcer     Type 2 diabetes mellitus with polyneuropathy        Past Surgical History:   Procedure Laterality Date    CERVICAL DISC SURGERY      EYE SURGERY      CATARACT    TONSILLECTOMY      TUBAL ABDOMINAL LIGATION           Current Outpatient Medications:     acetaminophen (TYLENOL) 325 MG tablet, Take 2 tablets by mouth Every 6 (Six) Hours As Needed for Mild Pain., Disp: , Rfl:     bisacodyl (DULCOLAX) 5 MG EC tablet, Take 1 tablet by mouth Daily As Needed for Constipation., Disp: , Rfl:     busPIRone (BUSPAR) 5 MG tablet, Take 1 tablet by mouth 2 (Two) Times a Day., Disp: , Rfl:     Cholecalciferol (Vitamin D3) 50 MCG (2000 UT) tablet, Take 1 tablet by mouth Every 30 (Thirty) Days., Disp: , Rfl:     Eliquis 5 MG tablet tablet, Take 1 tablet by mouth 2 (two) times a day., Disp: ,  Rfl:     ezetimibe (ZETIA) 10 MG tablet, Take 1 tablet by mouth Daily., Disp: , Rfl:     fenofibrate 160 MG tablet, Take 1 tablet by mouth Daily., Disp: , Rfl:     Klayesta [id number] UNIT/GM powder, , Disp: , Rfl:     levothyroxine (SYNTHROID, LEVOTHROID) 50 MCG tablet, Take 1 tablet by mouth Daily., Disp: , Rfl:     melatonin 5 MG tablet tablet, Take 2 tablets by mouth At Night As Needed., Disp: , Rfl:     metFORMIN (GLUCOPHAGE) 850 MG tablet, Take 1 tablet by mouth 2 (Two) Times a Day With Meals., Disp: , Rfl:     metoprolol succinate XL (TOPROL-XL) 25 MG 24 hr tablet, Take 1 tablet by mouth Daily. (Patient taking differently: Take 0.5 tablets by mouth Daily. Hold tablet if pulse is less than 50), Disp: 90 tablet, Rfl: 3    nitroglycerin (NITRODUR) 0.2 MG/HR patch, Place 1 patch on the skin as directed by provider See Admin Instructions. Apply patch daily, remove at night for at least 10 hours, Disp: 90 patch, Rfl: 3    Omega-3 300 MG capsule, Take  by mouth., Disp: , Rfl:     senna (Senokot) 8.6 MG tablet, Take 1 tablet by mouth Daily., Disp: , Rfl:     sertraline (ZOLOFT) 50 MG tablet, Take 0.5 tablets by mouth Daily., Disp: , Rfl:     vitamin B-12 (CYANOCOBALAMIN) 1000 MCG tablet, Take 1 tablet by mouth Every 30 (Thirty) Days. Injection, Disp: , Rfl:     lisinopril (PRINIVIL,ZESTRIL) 5 MG tablet, , Disp: , Rfl:     PEG-KCl-NaCl-NaSulf-Na Asc-C (Plenvu) 140 g reconstituted solution solution, Take 140 g by mouth Take As Directed for 1 day. Attn: Pharmacist: please see notes for coupon code., Disp: 1 each, Rfl: 0     Allergies   Allergen Reactions    Statins Unknown - High Severity    Penicillins Rash       Family History   Problem Relation Age of Onset    Diabetes Mother     Cancer Father     Stroke Brother     Diabetes Brother     Heart attack Other     Colon cancer Neg Hx         Social History     Social History Narrative    Not on file       Immunization:  Immunization History   Administered Date(s) Administered  "   COVID-19 (PFIZER) BIVALENT 12+YRS 10/25/2022    COVID-19 (PFIZER) Purple Cap Monovalent 12/28/2020, 10/28/2021        Objective     Vital Signs:   /49 (BP Location: Left arm, Patient Position: Sitting, Cuff Size: Adult)   Pulse 52   Ht 170.2 cm (67.01\")   Wt 86.2 kg (190 lb 1.6 oz)   SpO2 100%   BMI 29.77 kg/m²       Physical Exam  Constitutional:       Appearance: Normal appearance. She is normal weight.   HENT:      Head: Normocephalic and atraumatic.      Nose: Nose normal.   Pulmonary:      Effort: Pulmonary effort is normal.   Skin:     General: Skin is warm and dry.   Neurological:      Mental Status: She is alert and oriented to person, place, and time. Mental status is at baseline.   Psychiatric:         Mood and Affect: Mood normal.         Behavior: Behavior normal.         Thought Content: Thought content normal.         Judgment: Judgment normal.         Result Review :                 Assessment and Plan    Diagnoses and all orders for this visit:    1. Dysphagia, unspecified type (Primary)  -     Case Request; Standing  -     Verify NPO; Standing  -     Verify Bowel Prep Was Successful; Standing  -     Give Tap Water Enema If Bowel Prep Insufficient; Standing  -     Obtain Informed Consent; Standing  -     Case Request    2. Encounter for screening for malignant neoplasm of colon  -     Case Request; Standing  -     Verify NPO; Standing  -     Verify Bowel Prep Was Successful; Standing  -     Give Tap Water Enema If Bowel Prep Insufficient; Standing  -     Obtain Informed Consent; Standing  -     Case Request    Other orders  -     PEG-KCl-NaCl-NaSulf-Na Asc-C (Plenvu) 140 g reconstituted solution solution; Take 140 g by mouth Take As Directed for 1 day. Attn: Pharmacist: please see notes for coupon code.  Dispense: 1 each; Refill: 0    69 year old new patient presents to the office for dysphagia. Patient was previously having intermittent dysphagia but this has not occurred in several " months.  Patient has no further GI complaints today. She has never had a colonoscopy before.  Denies family history of colon cancer.  Patient will proceed with EGD/colonoscopy for further evaluation.  Cardiac and blood thinner (Eliquis) from Dr. Daniel.  Patient is agreeable to plan call the office any questions or concerns.    EGD/COLONOSCOPY Surgical Risk and Benefits: Possible risk/complications, benefits, and alternatives to surgical or invasive procedure have been explained to patient and/or legal guardian. Risks include bleeding, infection, and perforation. Patient has been evaluated and can tolerate anesthesia and/or sedation. Risk, benefits, and alternatives to anesthesia and sedation have been explained to patient and/or legal guardian.     Follow Up   No follow-ups on file.  Patient was given instructions and counseling regarding her condition or for health maintenance advice. Please see specific information pulled into the AVS if appropriate.

## 2024-10-02 NOTE — PROGRESS NOTES
Chief Complaint  Difficulty Swallowing (Solids and liquids )    Lydia Willoughby is a 69 y.o. female who presents to [facility]- [facility] on referral from No ref. provider found for a gastroenterology evaluation of dysphagia.      History of Present Illness  New patient presents to the office for dysphagia. Patient was previously having intermittent dysphagia but this has not occurred in several months. Denies heartburn, nausea, vomiting, epigastric pain, and dysphagia. No lower GI complaints. Denies family history of colon cancer.     FL Video Swallow Single Contrast 11/01/2019 - No evidence for aspiration or penetration.  See speech pathology note for additional clinical recommendations.      Past Medical History:   Diagnosis Date    Congestive heart failure (CHF)     Diabetes mellitus 09/10/2018    Foot cramps     Foot pain, left     Foot pain, right     Fracture     Heart attack     High blood cholesterol     Hypertension     Ingrowing toenail 09/10/2018    Irregular heart beat     Obesity     Polyneuropathy     Pressure ulcer, stage 1     Stroke     Tinea unguium     Type 2 diabetes mellitus with foot ulcer     Type 2 diabetes mellitus with polyneuropathy        Past Surgical History:   Procedure Laterality Date    CERVICAL DISC SURGERY      EYE SURGERY      CATARACT    TONSILLECTOMY      TUBAL ABDOMINAL LIGATION           Current Outpatient Medications:     acetaminophen (TYLENOL) 325 MG tablet, Take 2 tablets by mouth Every 6 (Six) Hours As Needed for Mild Pain., Disp: , Rfl:     bisacodyl (DULCOLAX) 5 MG EC tablet, Take 1 tablet by mouth Daily As Needed for Constipation., Disp: , Rfl:     busPIRone (BUSPAR) 5 MG tablet, Take 1 tablet by mouth 2 (Two) Times a Day., Disp: , Rfl:     Cholecalciferol (Vitamin D3) 50 MCG (2000 UT) tablet, Take 1 tablet by mouth Every 30 (Thirty) Days., Disp: , Rfl:     Eliquis 5 MG tablet tablet, Take 1 tablet by mouth 2 (two) times a day., Disp: ,  Rfl:     ezetimibe (ZETIA) 10 MG tablet, Take 1 tablet by mouth Daily., Disp: , Rfl:     fenofibrate 160 MG tablet, Take 1 tablet by mouth Daily., Disp: , Rfl:     Klayesta [id number] UNIT/GM powder, , Disp: , Rfl:     levothyroxine (SYNTHROID, LEVOTHROID) 50 MCG tablet, Take 1 tablet by mouth Daily., Disp: , Rfl:     melatonin 5 MG tablet tablet, Take 2 tablets by mouth At Night As Needed., Disp: , Rfl:     metFORMIN (GLUCOPHAGE) 850 MG tablet, Take 1 tablet by mouth 2 (Two) Times a Day With Meals., Disp: , Rfl:     metoprolol succinate XL (TOPROL-XL) 25 MG 24 hr tablet, Take 1 tablet by mouth Daily. (Patient taking differently: Take 0.5 tablets by mouth Daily. Hold tablet if pulse is less than 50), Disp: 90 tablet, Rfl: 3    nitroglycerin (NITRODUR) 0.2 MG/HR patch, Place 1 patch on the skin as directed by provider See Admin Instructions. Apply patch daily, remove at night for at least 10 hours, Disp: 90 patch, Rfl: 3    Omega-3 300 MG capsule, Take  by mouth., Disp: , Rfl:     senna (Senokot) 8.6 MG tablet, Take 1 tablet by mouth Daily., Disp: , Rfl:     sertraline (ZOLOFT) 50 MG tablet, Take 0.5 tablets by mouth Daily., Disp: , Rfl:     vitamin B-12 (CYANOCOBALAMIN) 1000 MCG tablet, Take 1 tablet by mouth Every 30 (Thirty) Days. Injection, Disp: , Rfl:     lisinopril (PRINIVIL,ZESTRIL) 5 MG tablet, , Disp: , Rfl:     PEG-KCl-NaCl-NaSulf-Na Asc-C (Plenvu) 140 g reconstituted solution solution, Take 140 g by mouth Take As Directed for 1 day. Attn: Pharmacist: please see notes for coupon code., Disp: 1 each, Rfl: 0     Allergies   Allergen Reactions    Statins Unknown - High Severity    Penicillins Rash       Family History   Problem Relation Age of Onset    Diabetes Mother     Cancer Father     Stroke Brother     Diabetes Brother     Heart attack Other     Colon cancer Neg Hx         Social History     Social History Narrative    Not on file       Immunization:  Immunization History   Administered Date(s) Administered  "   COVID-19 (PFIZER) BIVALENT 12+YRS 10/25/2022    COVID-19 (PFIZER) Purple Cap Monovalent 12/28/2020, 10/28/2021        Objective     Vital Signs:   /49 (BP Location: Left arm, Patient Position: Sitting, Cuff Size: Adult)   Pulse 52   Ht 170.2 cm (67.01\")   Wt 86.2 kg (190 lb 1.6 oz)   SpO2 100%   BMI 29.77 kg/m²       Physical Exam  Constitutional:       Appearance: Normal appearance. She is normal weight.   HENT:      Head: Normocephalic and atraumatic.      Nose: Nose normal.   Pulmonary:      Effort: Pulmonary effort is normal.   Skin:     General: Skin is warm and dry.   Neurological:      Mental Status: She is alert and oriented to person, place, and time. Mental status is at baseline.   Psychiatric:         Mood and Affect: Mood normal.         Behavior: Behavior normal.         Thought Content: Thought content normal.         Judgment: Judgment normal.         Result Review :                 Assessment and Plan    Diagnoses and all orders for this visit:    1. Dysphagia, unspecified type (Primary)  -     Case Request; Standing  -     Verify NPO; Standing  -     Verify Bowel Prep Was Successful; Standing  -     Give Tap Water Enema If Bowel Prep Insufficient; Standing  -     Obtain Informed Consent; Standing  -     Case Request    2. Encounter for screening for malignant neoplasm of colon  -     Case Request; Standing  -     Verify NPO; Standing  -     Verify Bowel Prep Was Successful; Standing  -     Give Tap Water Enema If Bowel Prep Insufficient; Standing  -     Obtain Informed Consent; Standing  -     Case Request    Other orders  -     PEG-KCl-NaCl-NaSulf-Na Asc-C (Plenvu) 140 g reconstituted solution solution; Take 140 g by mouth Take As Directed for 1 day. Attn: Pharmacist: please see notes for coupon code.  Dispense: 1 each; Refill: 0    69 year old new patient presents to the office for dysphagia. Patient was previously having intermittent dysphagia but this has not occurred in several " months.  Patient has no further GI complaints today. She has never had a colonoscopy before.  Denies family history of colon cancer.  Patient will proceed with EGD/colonoscopy for further evaluation.  Cardiac and blood thinner (Eliquis) from Dr. Daniel.  Patient is agreeable to plan call the office any questions or concerns.    EGD/COLONOSCOPY Surgical Risk and Benefits: Possible risk/complications, benefits, and alternatives to surgical or invasive procedure have been explained to patient and/or legal guardian. Risks include bleeding, infection, and perforation. Patient has been evaluated and can tolerate anesthesia and/or sedation. Risk, benefits, and alternatives to anesthesia and sedation have been explained to patient and/or legal guardian.     Follow Up   No follow-ups on file.  Patient was given instructions and counseling regarding her condition or for health maintenance advice. Please see specific information pulled into the AVS if appropriate.

## 2024-10-02 NOTE — TELEPHONE ENCOUNTER
Procedure: Colonoscopy and/or EGD     Med Directive: Eliquis     PMH: CHF, PAF, HTN, HLD, Heart murmur     Last Seen: 05/28/2024

## 2024-10-02 NOTE — TELEPHONE ENCOUNTER
10/2/2024    Dear DR FERRARA,     Patient: Lydia Willoughby   YOB: 1955        This patient is waiting to have a Colonoscopy and Esophagogastroduodenoscopy which I will perform at [facility] on 10.30.24 .     Our records indicate this patient is currently taking ELIQUIS. This procedure requires the patient to suspend their anticoagulant medication prior to surgery.     Please respond to this request noting your recommendations. You may contact our office at [phone] Option 1 with any questions. I appreciate your prompt response in this matter.     Please return this form to our office no later than two weeks prior to the procedure date listed above. Please return form to [phone].     ____ I approve my patient to stop taking their ELIQUIS 2 days prior to the scheduled procedure.    ____ I do NOT approve my patient to stop taking their ELIQUIS at this time.      ____ I approve my patient from a Cardiac  standpoint    ____ I do NOT approve my patient from a Cardiac  standpoint at this time      Please specify clearance expiration date:____________________________________      Approving physician name (please print): _____________________________________________      Approving physician signature: ________________________________ Date:________________    Sincerely,  [facility] - Gastroenterology   Dr. BEVERLY JON          Please fax approval or denial to our office as soon as possible.

## 2024-10-03 ENCOUNTER — PATIENT ROUNDING (BHMG ONLY) (OUTPATIENT)
Dept: GASTROENTEROLOGY | Facility: CLINIC | Age: 69
End: 2024-10-03
Payer: MEDICARE

## 2024-10-03 NOTE — PROGRESS NOTES
10/3/2024      Hello, may I speak with Lydia Willoughby     My name is Mary. I am calling from [facility]. I show that you had a recent visit with CYNDY Sanders.    Before we get started may I verify your date of birth? 1955    I am calling to officially welcome you to our practice and ask about your recent visit. Is this a good time to talk?  Patient in Nursing Home unable to talk at the moment     Tell me about your visit with us. What things went well?    We strive to ensure that we protect your safety and privacy. Is there anything we could have done to improve this during your visit?        We're always looking for ways to make our patients' experiences even better. Do you have recommendations on ways we may improve?    Overall were you satisfied with your first visit to our practice?    I appreciate you taking the time to speak with me today. Is there anything else I can do for you?    I am glad to hear that you had a very good visit and I appreciate you taking the time to provide feedback on this call. We would greatly appreciate you filling out a survey if you receive one in the mail, email or text. This is a great opportunity to provide any additional feedback that you may think of after this call as well.       Thank you, and have a great day.

## 2024-10-18 NOTE — PRE-PROCEDURE INSTRUCTIONS
Attempted to reach pt nurse at signature, nurse going to call back to given her the instructions below:   Reminded of arrival time at  1130       , Entrance C of the [facility] hospital. Instructed to bring or have a  over the age of 18 set up to drive you home the day of procedure.    Instructed on clear liquid diet the day before, nothing red or purple. Call with any questions about the prep or if in need of the prep.  Reminded them not to eat or drink anything am of procedure unless its a sip of water with medications. Hold eliquis 2 days prior to procedure. Hold diabetic meds, lisinopril am of procedure.  Patient verbalized understanding.

## 2024-10-22 NOTE — PRE-PROCEDURE INSTRUCTIONS
PAT call attempted.  Patient's nurse not available.  Left message to call back for instructions.  Cardiac and blood thinner clearances noted in chart.

## 2024-10-23 ENCOUNTER — TELEPHONE (OUTPATIENT)
Dept: GASTROENTEROLOGY | Facility: CLINIC | Age: 69
End: 2024-10-23
Payer: MEDICARE

## 2024-10-23 NOTE — TELEPHONE ENCOUNTER
Received email from [facility] PAT.  They were not able to reach pt.    Pt is at [facility], spoke with  who will give message to pts nurse there.   Nurse to call office back.      Pt scheduled for EGD/Colon on 10.30.24, 11:30 am arrival time.  Remind of liquid diet the day prior.  Remind of bowel prep and instructions.     Remind to hold Eliquis 2 days prior.    Reminded them not to eat or drink anything am of procedure unless its a sip of water with medications. Hold eliquis 2 days prior to procedure. Hold diabetic meds, lisinopril am of procedure.      ashley

## 2024-10-29 ENCOUNTER — ANESTHESIA EVENT (OUTPATIENT)
Dept: GASTROENTEROLOGY | Facility: HOSPITAL | Age: 69
End: 2024-10-29
Payer: MEDICARE

## 2024-10-29 NOTE — ANESTHESIA PREPROCEDURE EVALUATION
Anesthesia Evaluation     Patient summary reviewed and Nursing notes reviewed   NPO Solid Status: > 8 hours  NPO Liquid Status: > 2 hours           Airway   Mallampati: II  TM distance: >3 FB  Neck ROM: full  No difficulty expected  Dental    (+) upper dentures and poor dentition        Pulmonary - normal exam    breath sounds clear to auscultation  Cardiovascular - normal exam  Exercise tolerance: unable to assess (Pt mostly w/c /  bed bound )    ECG reviewed  PT is on anticoagulation therapy  Rhythm: regular  Rate: normal    (+) hypertension well controlled, past MI  >12 months, dysrhythmias Paroxysmal Atrial Fib, CHF , hyperlipidemia      Neuro/Psych  (+) CVA (right sdie hemiplagia, cognitive deficit) residual symptoms, numbness, poor historian.  GI/Hepatic/Renal/Endo    (+) obesity, diabetes mellitus type 2 well controlled    Musculoskeletal     Abdominal    Substance History      OB/GYN          Other        ROS/Med Hx Other: Dysphagia    Last dose Eliquis - 10/27  Last dose metoprolol 10/29     ECHO 11/17/23:   ·  Technically difficult study.  ·  Left ventricular ejection fraction appears to be 56 - 60%.  ·  Left ventricular wall thickness is consistent with septal asymmetric hypertrophy.  ·  Left ventricular diastolic function was indeterminate.  ·  Mild aortic insufficiency.  No significant aortic stenosis with max/mean pressure gradient 8/4 mmHg.    EKG 08/31/21: HR 62, SR, prolonged UT interval, left BBB     Cards clearance per ELLIOT Baron with acceptable risks on 10/03/24         Phys Exam Other: No sticks, BP's on right arm               Anesthesia Plan    ASA 3     general   total IV anesthesia  (Total IV Anesthesia    Patient understands anesthesia not responsible for dental damage.  )  intravenous induction     Anesthetic plan, risks, benefits, and alternatives have been provided, discussed and informed consent has been obtained with: patient and other (nurse aide).  Pre-procedure education  provided  Plan discussed with CRNA.      CODE STATUS:

## 2024-10-30 ENCOUNTER — ANESTHESIA (OUTPATIENT)
Dept: GASTROENTEROLOGY | Facility: HOSPITAL | Age: 69
End: 2024-10-30
Payer: MEDICARE

## 2024-10-30 ENCOUNTER — HOSPITAL ENCOUNTER (OUTPATIENT)
Facility: HOSPITAL | Age: 69
Setting detail: HOSPITAL OUTPATIENT SURGERY
Discharge: HOME | End: 2024-10-30
Attending: INTERNAL MEDICINE | Admitting: INTERNAL MEDICINE
Payer: MEDICARE

## 2024-10-30 VITALS
RESPIRATION RATE: 16 BRPM | TEMPERATURE: 97.3 F | HEART RATE: 57 BPM | WEIGHT: 187.83 LBS | DIASTOLIC BLOOD PRESSURE: 86 MMHG | BODY MASS INDEX: 29.41 KG/M2 | OXYGEN SATURATION: 97 % | SYSTOLIC BLOOD PRESSURE: 132 MMHG

## 2024-10-30 DIAGNOSIS — R13.10 DYSPHAGIA, UNSPECIFIED TYPE: ICD-10-CM

## 2024-10-30 DIAGNOSIS — Z12.11 ENCOUNTER FOR SCREENING FOR MALIGNANT NEOPLASM OF COLON: ICD-10-CM

## 2024-10-30 LAB — GLUCOSE BLDC GLUCOMTR-MCNC: 129 MG/DL (ref 70–99)

## 2024-10-30 PROCEDURE — 82948 REAGENT STRIP/BLOOD GLUCOSE: CPT

## 2024-10-30 PROCEDURE — 45385 COLONOSCOPY W/LESION REMOVAL: CPT | Performed by: INTERNAL MEDICINE

## 2024-10-30 PROCEDURE — 25010000002 LIDOCAINE PF 2% 2 % SOLUTION

## 2024-10-30 PROCEDURE — 25010000002 PROPOFOL 10 MG/ML EMULSION

## 2024-10-30 PROCEDURE — 43239 EGD BIOPSY SINGLE/MULTIPLE: CPT | Performed by: INTERNAL MEDICINE

## 2024-10-30 PROCEDURE — 25810000003 LACTATED RINGERS PER 1000 ML

## 2024-10-30 PROCEDURE — 88305 TISSUE EXAM BY PATHOLOGIST: CPT | Performed by: INTERNAL MEDICINE

## 2024-10-30 RX ORDER — PROPOFOL 10 MG/ML
VIAL (ML) INTRAVENOUS AS NEEDED
Status: DISCONTINUED | OUTPATIENT
Start: 2024-10-30 | End: 2024-10-30 | Stop reason: SURG

## 2024-10-30 RX ORDER — SODIUM CHLORIDE, SODIUM LACTATE, POTASSIUM CHLORIDE, CALCIUM CHLORIDE 600; 310; 30; 20 MG/100ML; MG/100ML; MG/100ML; MG/100ML
30 INJECTION, SOLUTION INTRAVENOUS CONTINUOUS
Status: DISCONTINUED | OUTPATIENT
Start: 2024-10-30 | End: 2024-10-30 | Stop reason: HOSPADM

## 2024-10-30 RX ORDER — LIDOCAINE HYDROCHLORIDE 20 MG/ML
INJECTION, SOLUTION EPIDURAL; INFILTRATION; INTRACAUDAL; PERINEURAL AS NEEDED
Status: DISCONTINUED | OUTPATIENT
Start: 2024-10-30 | End: 2024-10-30 | Stop reason: SURG

## 2024-10-30 RX ADMIN — LIDOCAINE HYDROCHLORIDE 80 MG: 20 INJECTION, SOLUTION EPIDURAL; INFILTRATION; INTRACAUDAL; PERINEURAL at 12:53

## 2024-10-30 RX ADMIN — SODIUM CHLORIDE, POTASSIUM CHLORIDE, SODIUM LACTATE AND CALCIUM CHLORIDE 30 ML/HR: 600; 310; 30; 20 INJECTION, SOLUTION INTRAVENOUS at 12:27

## 2024-10-30 RX ADMIN — PROPOFOL 150 MCG/KG/MIN: 10 INJECTION, EMULSION INTRAVENOUS at 12:55

## 2024-10-30 RX ADMIN — PROPOFOL 50 MG: 10 INJECTION, EMULSION INTRAVENOUS at 12:53

## 2024-10-30 NOTE — ANESTHESIA POSTPROCEDURE EVALUATION
Patient: Lydia Willoughby    Procedure Summary       Date: 10/30/24 Room / Location: [facility] ENDOSCOPY 2 / [facility]    Anesthesia Start: 1250 Anesthesia Stop: 1331    Procedures:       ESOPHAGOGASTRODUODENOSCOPY WITH BIOPSIES      COLONOSCOPY WITH HOT SNARE POLYPECTOMY Diagnosis:       Dysphagia, unspecified type      Encounter for screening for malignant neoplasm of colon      (Dysphagia, unspecified type [R13.10])      (Encounter for screening for malignant neoplasm of colon [Z12.11])    Surgeons: Álvaro Alexander MD Provider: Amelie Mayberry CRNA    Anesthesia Type: general ASA Status: 3            Anesthesia Type: general    Vitals  Vitals Value Taken Time   /86 10/30/24 1346   Temp 36.3 °C (97.3 °F) 10/30/24 1329   Pulse 57 10/30/24 1346   Resp 16 10/30/24 1329   SpO2 97 % 10/30/24 1346           Post Anesthesia Care and Evaluation    Post-procedure mental status: acceptable.  Pain management: satisfactory to patient    Airway patency: patent  Anesthetic complications: No anesthetic complications    Cardiovascular status: acceptable  Respiratory status: acceptable    Comments: Per chart review

## 2024-11-01 ENCOUNTER — TELEPHONE (OUTPATIENT)
Dept: GASTROENTEROLOGY | Facility: CLINIC | Age: 69
End: 2024-11-01
Payer: MEDICARE

## 2024-11-01 LAB
CYTO UR: NORMAL
LAB AP CASE REPORT: NORMAL
LAB AP CLINICAL INFORMATION: NORMAL
PATH REPORT.FINAL DX SPEC: NORMAL
PATH REPORT.GROSS SPEC: NORMAL

## 2024-11-01 NOTE — TELEPHONE ENCOUNTER
----- Message from Shelli Bey sent at 11/1/2024 10:10 AM EDT -----  I have reviewed the patient colonoscopy and pathology report. Patient has tubular adenoma and tubulovillous adenoma polyp(s) on pathology report. Due to size or number of polyps, It is recommended the patient be on a 3 year recall. Please place in the recall system.    I have reviewed upper endoscopy. Reactive gastropathy. Negative results for H. Pylori, metaplasia, dysplasia and malignancy.

## 2024-11-21 ENCOUNTER — TRANSCRIBE ORDERS (OUTPATIENT)
Dept: ADMINISTRATIVE | Facility: HOSPITAL | Age: 69
End: 2024-11-21
Payer: MEDICARE

## 2024-11-21 DIAGNOSIS — R55 SYNCOPE WITH ABNORMAL NEUROLOGIC EXAMINATION: Primary | ICD-10-CM

## 2024-11-21 DIAGNOSIS — R29.90 SYNCOPE WITH ABNORMAL NEUROLOGIC EXAMINATION: Primary | ICD-10-CM

## 2024-11-23 ENCOUNTER — HOSPITAL ENCOUNTER (EMERGENCY)
Facility: HOSPITAL | Age: 69
Discharge: HOME | End: 2024-11-23
Attending: EMERGENCY MEDICINE
Payer: MEDICARE

## 2024-11-23 VITALS
BODY MASS INDEX: 31.42 KG/M2 | HEIGHT: 67 IN | DIASTOLIC BLOOD PRESSURE: 69 MMHG | SYSTOLIC BLOOD PRESSURE: 190 MMHG | OXYGEN SATURATION: 100 % | TEMPERATURE: 98 F | HEART RATE: 51 BPM | RESPIRATION RATE: 16 BRPM | WEIGHT: 200.18 LBS

## 2024-11-23 DIAGNOSIS — Z79.01 CHRONIC ANTICOAGULATION: ICD-10-CM

## 2024-11-23 DIAGNOSIS — K62.5 BRIGHT RED RECTAL BLEEDING: Primary | ICD-10-CM

## 2024-11-23 DIAGNOSIS — K64.9 HEMORRHOIDS, UNSPECIFIED HEMORRHOID TYPE: ICD-10-CM

## 2024-11-23 LAB
ALBUMIN SERPL-MCNC: 4 G/DL (ref 3.5–5.2)
ALBUMIN/GLOB SERPL: 1.3 G/DL
ALP SERPL-CCNC: 39 U/L (ref 39–117)
ALT SERPL W P-5'-P-CCNC: 10 U/L (ref 1–33)
ANION GAP SERPL CALCULATED.3IONS-SCNC: 10.1 MMOL/L (ref 5–15)
AST SERPL-CCNC: 14 U/L (ref 1–32)
BASOPHILS # BLD AUTO: 0.09 10*3/MM3 (ref 0–0.2)
BASOPHILS NFR BLD AUTO: 1.1 % (ref 0–1.5)
BILIRUB SERPL-MCNC: 0.3 MG/DL (ref 0–1.2)
BUN SERPL-MCNC: 19 MG/DL (ref 8–23)
BUN/CREAT SERPL: 17.4 (ref 7–25)
CALCIUM SPEC-SCNC: 9.3 MG/DL (ref 8.6–10.5)
CHLORIDE SERPL-SCNC: 102 MMOL/L (ref 98–107)
CO2 SERPL-SCNC: 25.9 MMOL/L (ref 22–29)
CREAT SERPL-MCNC: 1.09 MG/DL (ref 0.57–1)
DEPRECATED RDW RBC AUTO: 47.3 FL (ref 37–54)
EGFRCR SERPLBLD CKD-EPI 2021: 55.8 ML/MIN/1.73
EOSINOPHIL # BLD AUTO: 0.15 10*3/MM3 (ref 0–0.4)
EOSINOPHIL NFR BLD AUTO: 1.8 % (ref 0.3–6.2)
ERYTHROCYTE [DISTWIDTH] IN BLOOD BY AUTOMATED COUNT: 15.1 % (ref 12.3–15.4)
GLOBULIN UR ELPH-MCNC: 3.2 GM/DL
GLUCOSE SERPL-MCNC: 102 MG/DL (ref 65–99)
HCT VFR BLD AUTO: 39.4 % (ref 34–46.6)
HEMOCCULT STL QL IA: POSITIVE
HGB BLD-MCNC: 11.9 G/DL (ref 12–15.9)
HOLD SPECIMEN: NORMAL
HOLD SPECIMEN: NORMAL
IMM GRANULOCYTES # BLD AUTO: 0.04 10*3/MM3 (ref 0–0.05)
IMM GRANULOCYTES NFR BLD AUTO: 0.5 % (ref 0–0.5)
LYMPHOCYTES # BLD AUTO: 2.29 10*3/MM3 (ref 0.7–3.1)
LYMPHOCYTES NFR BLD AUTO: 28 % (ref 19.6–45.3)
MCH RBC QN AUTO: 26 PG (ref 26.6–33)
MCHC RBC AUTO-ENTMCNC: 30.2 G/DL (ref 31.5–35.7)
MCV RBC AUTO: 86.2 FL (ref 79–97)
MONOCYTES # BLD AUTO: 0.67 10*3/MM3 (ref 0.1–0.9)
MONOCYTES NFR BLD AUTO: 8.2 % (ref 5–12)
NEUTROPHILS NFR BLD AUTO: 4.95 10*3/MM3 (ref 1.7–7)
NEUTROPHILS NFR BLD AUTO: 60.4 % (ref 42.7–76)
NRBC BLD AUTO-RTO: 0 /100 WBC (ref 0–0.2)
PLATELET # BLD AUTO: 232 10*3/MM3 (ref 140–450)
PMV BLD AUTO: 10.8 FL (ref 6–12)
POTASSIUM SERPL-SCNC: 4.8 MMOL/L (ref 3.5–5.2)
PROT SERPL-MCNC: 7.2 G/DL (ref 6–8.5)
RBC # BLD AUTO: 4.57 10*6/MM3 (ref 3.77–5.28)
SODIUM SERPL-SCNC: 138 MMOL/L (ref 136–145)
WBC NRBC COR # BLD AUTO: 8.19 10*3/MM3 (ref 3.4–10.8)
WHOLE BLOOD HOLD COAG: NORMAL
WHOLE BLOOD HOLD SPECIMEN: NORMAL

## 2024-11-23 PROCEDURE — 85025 COMPLETE CBC W/AUTO DIFF WBC: CPT | Performed by: EMERGENCY MEDICINE

## 2024-11-23 PROCEDURE — 80053 COMPREHEN METABOLIC PANEL: CPT | Performed by: EMERGENCY MEDICINE

## 2024-11-23 PROCEDURE — 82274 ASSAY TEST FOR BLOOD FECAL: CPT | Performed by: EMERGENCY MEDICINE

## 2024-11-23 PROCEDURE — 99283 EMERGENCY DEPT VISIT LOW MDM: CPT

## 2024-11-23 RX ORDER — SODIUM CHLORIDE 0.9 % (FLUSH) 0.9 %
10 SYRINGE (ML) INJECTION AS NEEDED
Status: DISCONTINUED | OUTPATIENT
Start: 2024-11-23 | End: 2024-11-23 | Stop reason: HOSPADM

## 2024-11-24 NOTE — ED PROVIDER NOTES
Time: 7:45 PM EST  Date of encounter:  11/23/2024  Independent Historian/Clinical History and Information was obtained by:   Patient and Nursing Staff    History is limited by: Dementia    Chief Complaint: Rectal bleeding today      History of Present Illness:  Patient is a 69 y.o. year old female with history of diabetes, CHF, previous stroke now on Eliquis anticoagulation who presents to the emergency department for evaluation of small amounts of bright red blood today x 2 episodes at nursing facility.    Patient denies any abdominal pain whatsoever, no vomiting blood or coffee grounds or nausea.    She denies any black tarry stool.    Sounds like she had a small amount of red blood with bowel movements x 2 at the nursing facility.    She is not hypotensive or tachycardic here.      Patient Care Team  Primary Care Provider: Hilario Langston MD    Past Medical History:     Allergies   Allergen Reactions    Statins Unknown - High Severity    Penicillins Rash     Past Medical History:   Diagnosis Date    Congestive heart failure (CHF)     Diabetes mellitus 09/10/2018    TYPE II    Foot cramps     Foot pain, left     Foot pain, right     Fracture     Heart attack     High blood cholesterol     Hypertension     Ingrowing toenail 09/10/2018    Irregular heart beat     Obesity     Polyneuropathy     Pressure ulcer, stage 1     Stroke     Tinea unguium     Type 2 diabetes mellitus with foot ulcer     Type 2 diabetes mellitus with polyneuropathy      Past Surgical History:   Procedure Laterality Date    CERVICAL DISC SURGERY      COLONOSCOPY N/A 10/30/2024    Procedure: COLONOSCOPY WITH HOT SNARE POLYPECTOMY;  Surgeon: Álvaro Alexander MD;  Location: [facility] ENDOSCOPY;  Service: Gastroenterology;  Laterality: N/A;  COLON POLYPS, DIVERTICULOSIS    ENDOSCOPY N/A 10/30/2024    Procedure: ESOPHAGOGASTRODUODENOSCOPY WITH BIOPSIES;  Surgeon: Álvaro Alexander MD;  Location: [facility] ENDOSCOPY;  Service:  Gastroenterology;  Laterality: N/A;  HIATAL HERNIA, GASTRITIS    EYE SURGERY      CATARACT    TONSILLECTOMY      TUBAL ABDOMINAL LIGATION       Family History   Problem Relation Age of Onset    Diabetes Mother     Cancer Father     Stroke Brother     Diabetes Brother     Heart attack Other     Colon cancer Neg Hx        Home Medications:  Prior to Admission medications    Medication Sig Start Date End Date Taking? Authorizing Provider   acetaminophen (TYLENOL) 325 MG tablet Take 2 tablets by mouth Every 6 (Six) Hours As Needed for Mild Pain.    Kristina Newton MD   bisacodyl (DULCOLAX) 5 MG EC tablet Take 1 tablet by mouth Daily As Needed for Constipation.    Kristina Newton MD   busPIRone (BUSPAR) 5 MG tablet Take 1 tablet by mouth 2 (Two) Times a Day. 5/21/24   Kristina Newton MD   Cholecalciferol (Vitamin D3) 50 MCG (2000 UT) tablet Take 1 tablet by mouth Every 30 (Thirty) Days.    Kristina Newton MD   Eliquis 5 MG tablet tablet Take 1 tablet by mouth 2 (two) times a day. 8/9/21   Kristina Newton MD   ezetimibe (ZETIA) 10 MG tablet Take 1 tablet by mouth Daily. 8/17/21   Kristina Newton MD   fenofibrate 160 MG tablet Take 1 tablet by mouth Daily. 7/28/21   Kristina Newton MD   Klayesta 032530 UNIT/GM powder  7/20/24   Kristina Newton MD   levothyroxine (SYNTHROID, LEVOTHROID) 50 MCG tablet Take 1 tablet by mouth Daily.    Kristina Newton MD   lisinopril (PRINIVIL,ZESTRIL) 5 MG tablet  9/25/24   Kristina Newton MD   melatonin 5 MG tablet tablet Take 2 tablets by mouth At Night As Needed.    Kristina Newton MD   metFORMIN (GLUCOPHAGE) 850 MG tablet Take 1 tablet by mouth 2 (Two) Times a Day With Meals. 5/21/24   Kristina Newton MD   metoprolol succinate XL (TOPROL-XL) 25 MG 24 hr tablet Take 1 tablet by mouth Daily.  Patient taking differently: Take 0.5 tablets by mouth Daily. Hold tablet if pulse is less than 50 11/2/23   Shivani Galvin  "CYNDY Miranda   nitroglycerin (NITRODUR) 0.2 MG/HR patch Place 1 patch on the skin as directed by provider See Admin Instructions. Apply patch daily, remove at night for at least 10 hours 8/23/21   Peraza Krys CYNDY Adame   Omega-3 300 MG capsule Take  by mouth.    ProviderKristina MD   senna (Senokot) 8.6 MG tablet Take 1 tablet by mouth Daily.    Kristina Newton MD   sertraline (ZOLOFT) 50 MG tablet Take 0.5 tablets by mouth Daily.    Kristina Newton MD   vitamin B-12 (CYANOCOBALAMIN) 1000 MCG tablet Take 1 tablet by mouth Every 30 (Thirty) Days. Injection    ProviderKristina MD        Social History:   Social History     Tobacco Use    Smoking status: Former     Passive exposure: Past    Smokeless tobacco: Former   Vaping Use    Vaping status: Never Used   Substance Use Topics    Alcohol use: Yes     Comment: light    Drug use: Never         Review of Systems:  Review of Systems   I performed a 10 point review of systems which was all negative, except for the positives found in the HPI above.  Physical Exam:  BP (!) 190/69   Pulse 51   Temp 98 °F (36.7 °C) (Oral)   Resp 16   Ht 170.2 cm (67.01\")   Wt 90.8 kg (200 lb 2.8 oz)   SpO2 100%   BMI 31.34 kg/m²         Physical Exam   General: Awake alert and in no obvious distress    HEENT: Head normocephalic atraumatic, eyes PERRLA EOMI, nose normal, oropharynx normal.    Neck: Supple full range of motion, no meningismus, no lymphadenopathy    Heart: Regular rate and rhythm, no murmurs or rubs, 2+ radial pulses bilaterally    Lungs: Clear to auscultation bilaterally without wheezes or crackles, no respiratory distress    Abdomen: Soft, nontender, nondistended, no rebound or guarding    Rectal exam: She has normal tone, brown stool with trace amounts of bright red blood present but no melena or black stool, external hemorrhoids noted but no active bleeding.    Skin: Warm, dry, no rash    Musculoskeletal: Normal range of motion, no lower " extremity edema    Neurologic: Oriented to self and place but not to the situation, likely baseline dementia,, no motor deficits no sensory deficits    Psychiatric: Mood appears stable, no psychosis          Procedures:  Procedures      Medical Decision Making:      Comorbidities that affect care:    Chronic anticoagulation    External Notes reviewed:    Previous Labs: I compared her lab work today to her old blood work and hemoglobin of 11.9 appears to be her baseline from 5 years ago.      The following orders were placed and all results were independently analyzed by me:  Orders Placed This Encounter   Procedures    Occult Blood, Fecal By Immunoassay - Stool, Per Rectum    [place] Draw    Comprehensive Metabolic Panel    CBC Auto Differential    Insert peripheral IV    CBC & Differential    Green Top (Gel)    Lavender Top    Gold Top - SST    Light Blue Top       Medications Given in the Emergency Department:  Medications   sodium chloride 0.9 % flush 10 mL (has no administration in time range)        ED Course:         Labs:    Lab Results (last 24 hours)       Procedure Component Value Units Date/Time    CBC & Differential [[id number]]  (Abnormal) Collected: 11/23/24 1900    Specimen: Blood Updated: 11/23/24 1905    Narrative:      The following orders were created for panel order CBC & Differential.  Procedure                               Abnormality         Status                     ---------                               -----------         ------                     CBC Auto Differential[[id number]]        Abnormal            Final result                 Please view results for these tests on the individual orders.    Comprehensive Metabolic Panel [[id number]]  (Abnormal) Collected: 11/23/24 1900    Specimen: Blood Updated: 11/23/24 1926     Glucose 102 mg/dL      BUN 19 mg/dL      Creatinine 1.09 mg/dL      Sodium 138 mmol/L      Potassium 4.8 mmol/L      Chloride 102 mmol/L      CO2 25.9 mmol/L       Calcium 9.3 mg/dL      Total Protein 7.2 g/dL      Albumin 4.0 g/dL      ALT (SGPT) 10 U/L      AST (SGOT) 14 U/L      Alkaline Phosphatase 39 U/L      Total Bilirubin 0.3 mg/dL      Globulin 3.2 gm/dL      A/G Ratio 1.3 g/dL      BUN/Creatinine Ratio 17.4     Anion Gap 10.1 mmol/L      eGFR 55.8 mL/min/1.73     Narrative:      GFR Normal >60  Chronic Kidney Disease <60  Kidney Failure <15      CBC Auto Differential [[id number]]  (Abnormal) Collected: 11/23/24 1900    Specimen: Blood Updated: 11/23/24 1905     WBC 8.19 10*3/mm3      RBC 4.57 10*6/mm3      Hemoglobin 11.9 g/dL      Hematocrit 39.4 %      MCV 86.2 fL      MCH 26.0 pg      MCHC 30.2 g/dL      RDW 15.1 %      RDW-SD 47.3 fl      MPV 10.8 fL      Platelets 232 10*3/mm3      Neutrophil % 60.4 %      Lymphocyte % 28.0 %      Monocyte % 8.2 %      Eosinophil % 1.8 %      Basophil % 1.1 %      Immature Grans % 0.5 %      Neutrophils, Absolute 4.95 10*3/mm3      Lymphocytes, Absolute 2.29 10*3/mm3      Monocytes, Absolute 0.67 10*3/mm3      Eosinophils, Absolute 0.15 10*3/mm3      Basophils, Absolute 0.09 10*3/mm3      Immature Grans, Absolute 0.04 10*3/mm3      nRBC 0.0 /100 WBC     Occult Blood, Fecal By Immunoassay - Stool, Per Rectum [[id number]]  (Abnormal) Collected: 11/23/24 1907    Specimen: Stool from Per Rectum Updated: 11/23/24 1921     Occult Blood, Fecal by Immunoassay Positive             Imaging:    No Radiology Exams Resulted Within Past 24 Hours      Differential Diagnosis and Discussion:    GI Bleeding: Differential diagnosis includes but is not limited to gastritis, gastric ulcer, stress ulcer, duodenitis, Leticia-Haywood tears, esophageal varices, angiodysplasia, aortic enteric fistula, hematologic issues including thrombocytopenia, GI neoplasm, ulcerative colitis, Crohn's disease, diverticulosis, diverticulitis, hemorrhoids, aortic aneurysm, and polyps    All labs were reviewed and interpreted by me.    MDM         This patient is a pleasant  69-year-old female with previous stroke now on Eliquis anticoagulation presenting from nursing facility for some bright red blood in stools today.    She is anticoagulated on Eliquis, but no active bleeding seen here.    She is hemodynamically stable and not hypotensive or tachycardic.    Her blood counts today of 11.9 hemoglobin are at her baseline levels.    We are we will observe her in the ED for couple hours but in short if she is not continuing to have bleeding I think she can be observed at her nursing facility overnight and no need for hospital admission.    I will request that they hold off on her nighttime dose of Eliquis and possibly even tomorrow morning's dose of Eliquis to allow the rectal bleeding to resolve as I feel it is likely hemorrhoidal.    She was given return precautions for any worsening bleeding.                Patient Care Considerations:    CT ABDOMEN AND PELVIS: I considered ordering a CT scan of the abdomen and pelvis however she has no abdominal pain or tenderness whatsoever.      Consultants/Shared Management Plan:        Social Determinants of Health:    Patient is a nursing home/assisted living resident and has reliable access to care.      Disposition and Care Coordination:    Discharged: I considered escalation of care by admitting this patient to the hospital, however no further rectal bleeding is seen here and she is hemodynamically stable and blood counts are stable.    I have explained the patient´s condition, diagnoses and treatment plan based on the information available to me at this time. I have answered questions and addressed any concerns. The patient has a good  understanding of the patient´s diagnosis, condition, and treatment plan as can be expected at this point. The vital signs have been stable. The patient´s condition is stable and appropriate for discharge from the emergency department.      The patient will pursue further outpatient evaluation with the primary  care physician or other designated or consulting physician as outlined in the discharge instructions. They are agreeable to this plan of care and follow-up instructions have been explained in detail. The patient has received these instructions in written format and has expressed an understanding of the discharge instructions. The patient is aware that any significant change in condition or worsening of symptoms should prompt an immediate return to this or the closest emergency department or call to 911.  I have explained discharge medications and the need for follow up with the patient/caretakers. This was also printed in the discharge instructions. Patient was discharged with the following medications and follow up:      Medication List        Changed      metoprolol succinate XL 25 MG 24 hr tablet  Commonly known as: TOPROL-XL  Take 1 tablet by mouth Daily.  What changed:   how much to take  additional instructions           Hilario Langston MD  [address] [facility] [id number] [phone]    Call in 1 day  As needed, for a follow-up appointment       Final diagnoses:   Bright red rectal bleeding   Hemorrhoids, unspecified hemorrhoid type   Chronic anticoagulation        ED Disposition       ED Disposition   Discharge    Condition   Stable    Comment   --               This medical record created using voice recognition software.             Jack Mendoza MD  11/23/24 1950

## 2024-11-24 NOTE — DISCHARGE INSTRUCTIONS
It looks like you may have had some bleeding from your hemorrhoids in the setting of being on the Eliquis blood thinning medication for your previous stroke.    No current bleeding is seen on physical exam and your blood counts are stable with hemoglobin of 11.9, unchanged from your old blood counts even 5 years ago.    At this time you do not need to be admitted to the hospital but we recommend you skip your next 2 doses of Eliquis and then resume your usual dosing, and hopefully the bleeding resolves.    If you continue to have episodes of rectal bleeding or large amounts of blood in the toilet, definitely come back to the emergency department at that time.

## 2024-12-17 ENCOUNTER — OFFICE VISIT (OUTPATIENT)
Dept: CARDIOLOGY | Facility: CLINIC | Age: 69
End: 2024-12-17
Payer: MEDICARE

## 2024-12-17 VITALS
HEART RATE: 56 BPM | SYSTOLIC BLOOD PRESSURE: 137 MMHG | BODY MASS INDEX: 31.39 KG/M2 | WEIGHT: 200 LBS | DIASTOLIC BLOOD PRESSURE: 63 MMHG | HEIGHT: 67 IN

## 2024-12-17 DIAGNOSIS — I48.0 PAROXYSMAL ATRIAL FIBRILLATION: ICD-10-CM

## 2024-12-17 DIAGNOSIS — R06.02 SHORTNESS OF BREATH: Primary | ICD-10-CM

## 2024-12-17 DIAGNOSIS — I10 BENIGN ESSENTIAL HTN: ICD-10-CM

## 2024-12-17 RX ORDER — PANTOPRAZOLE SODIUM 40 MG/1
40 TABLET, DELAYED RELEASE ORAL DAILY
COMMUNITY
Start: 2024-12-02

## 2024-12-17 RX ORDER — APIXABAN 5 MG/1
5 TABLET, FILM COATED ORAL EVERY 12 HOURS SCHEDULED
Qty: 60 TABLET | Refills: 3 | Status: SHIPPED | OUTPATIENT
Start: 2024-12-17

## 2024-12-17 RX ORDER — ONDANSETRON 4 MG/1
4 TABLET, ORALLY DISINTEGRATING ORAL EVERY 8 HOURS PRN
COMMUNITY
Start: 2024-10-30

## 2024-12-17 NOTE — ASSESSMENT & PLAN NOTE
Patient has not been on anticoagulation.  The importance of anticoagulation was discussed with her today.  I wrote a note for her nursing home to make sure that she resumes her anticoagulation.  It was evidently stopped during her recent ER visit where she had some rectal bleeding which was thought to be related to hemorrhoids.  However it was only supposed to be held for 2 doses.  Okay to resume.  New order sent.  Continue metoprolol.  She is in a normal rhythm on exam today.

## 2024-12-17 NOTE — PROGRESS NOTES
Chief Complaint  Hypertension and Follow-up (6 mo f/u. )    Subjective        History of Present Illness  Lydia Willoughby presents to [facility] CARDIOLOGY for follow up.   Patient is a 69-year-old female with past medical history outlined below, significant for diabetes, previous stroke, hypertension, A-fib, aortic valve regurgitation and hyperlipidemia who presents for follow-up.  She has been having shortness of breath that occurs at rest.  It improves with laying down.  It happens sporadically.  She denies exertional symptoms but is mostly wheelchair-bound due to previous stroke.  She has no chest pain or discomfort.  She has no dizziness, lightheadedness or edema.  She has not been taking her anticoagulation.  It looks like it was discontinued during her recent emergency room visit where she was having some rectal bleeding which was thought to be related to hemorrhoids.    Past Medical History:   Diagnosis Date    Congestive heart failure (CHF)     Diabetes mellitus 09/10/2018    TYPE II    Foot cramps     Foot pain, left     Foot pain, right     Fracture     Heart attack     High blood cholesterol     Hypertension     Ingrowing toenail 09/10/2018    Irregular heart beat     Obesity     Polyneuropathy     Pressure ulcer, stage 1     Stroke     Tinea unguium     Type 2 diabetes mellitus with foot ulcer     Type 2 diabetes mellitus with polyneuropathy        ALLERGY  Allergies   Allergen Reactions    Statins Unknown - High Severity    Penicillins Rash        Past Surgical History:   Procedure Laterality Date    CERVICAL DISC SURGERY      COLONOSCOPY N/A 10/30/2024    Procedure: COLONOSCOPY WITH HOT SNARE POLYPECTOMY;  Surgeon: Álvaro Alexander MD;  Location: [facility] ENDOSCOPY;  Service: Gastroenterology;  Laterality: N/A;  COLON POLYPS, DIVERTICULOSIS    ENDOSCOPY N/A 10/30/2024    Procedure: ESOPHAGOGASTRODUODENOSCOPY WITH BIOPSIES;  Surgeon: Álvaro Alexander MD;  Location: [facility]  ENDOSCOPY;  Service: Gastroenterology;  Laterality: N/A;  HIATAL HERNIA, GASTRITIS    EYE SURGERY      CATARACT    TONSILLECTOMY      TUBAL ABDOMINAL LIGATION          Social History     Socioeconomic History    Marital status:    Tobacco Use    Smoking status: Former     Passive exposure: Past    Smokeless tobacco: Former   Vaping Use    Vaping status: Never Used   Substance and Sexual Activity    Alcohol use: Yes     Comment: light    Drug use: Never    Sexual activity: Defer       Family History   Problem Relation Age of Onset    Diabetes Mother     Cancer Father     Stroke Brother     Diabetes Brother     Heart attack Other     Colon cancer Neg Hx         Current Outpatient Medications on File Prior to Visit   Medication Sig    acetaminophen (TYLENOL) 325 MG tablet Take 2 tablets by mouth Every 6 (Six) Hours As Needed for Mild Pain.    bisacodyl (DULCOLAX) 5 MG EC tablet Take 1 tablet by mouth Daily As Needed for Constipation.    busPIRone (BUSPAR) 5 MG tablet Take 1 tablet by mouth 2 (Two) Times a Day.    Cholecalciferol (Vitamin D3) 50 MCG (2000 UT) tablet Take 1 tablet by mouth Every 30 (Thirty) Days.    ezetimibe (ZETIA) 10 MG tablet Take 1 tablet by mouth Daily.    fenofibrate 160 MG tablet Take 1 tablet by mouth Daily.    levothyroxine (SYNTHROID, LEVOTHROID) 50 MCG tablet Take 1 tablet by mouth Daily.    lisinopril (PRINIVIL,ZESTRIL) 5 MG tablet Take 2 tablets by mouth Daily.    melatonin 5 MG tablet tablet Take 2 tablets by mouth At Night As Needed.    metFORMIN (GLUCOPHAGE) 500 MG tablet Take 1 tablet by mouth 2 (Two) Times a Day With Meals.    metoprolol succinate XL (TOPROL-XL) 25 MG 24 hr tablet Take 1 tablet by mouth Daily. (Patient taking differently: Take 0.5 tablets by mouth Daily. Hold tablet if pulse is less than 50)    nitroglycerin (NITRODUR) 0.2 MG/HR patch Place 1 patch on the skin as directed by provider See Admin Instructions. Apply patch daily, remove at night for at least 10  "hours    Omega-3 300 MG capsule Take  by mouth.    ondansetron ODT (ZOFRAN-ODT) 4 MG disintegrating tablet Place 1 tablet on the tongue Every 8 (Eight) Hours As Needed.    pantoprazole (PROTONIX) 40 MG EC tablet Take 1 tablet by mouth Daily.    senna (Senokot) 8.6 MG tablet Take 1 tablet by mouth Daily.    [DISCONTINUED] SERTRALINE HCL PO Take 75 mg by mouth Daily.    [DISCONTINUED] vitamin B-12 (CYANOCOBALAMIN) 1000 MCG tablet Take 1 tablet by mouth Every 30 (Thirty) Days. Injection    [DISCONTINUED] Eliquis 5 MG tablet tablet Take 1 tablet by mouth 2 (two) times a day. (Patient not taking: Reported on 12/17/2024)    [DISCONTINUED] Klayesta 621501 UNIT/GM powder  (Patient not taking: Reported on 12/17/2024)     No current facility-administered medications on file prior to visit.       Objective   Vitals:    12/17/24 1418   BP: 137/63   Pulse: 56   Weight: 90.7 kg (200 lb)   Height: 170.2 cm (67.01\")       Physical Exam  Constitutional:       General: She is awake. She is not in acute distress.     Appearance: Normal appearance.   HENT:      Head: Normocephalic.      Nose: Nose normal. No congestion.   Eyes:      Extraocular Movements: Extraocular movements intact.      Conjunctiva/sclera: Conjunctivae normal.      Pupils: Pupils are equal, round, and reactive to light.   Neck:      Thyroid: No thyromegaly.      Vascular: No JVD.   Cardiovascular:      Rate and Rhythm: Normal rate and regular rhythm.      Chest Wall: PMI is not displaced.      Pulses: Normal pulses.      Heart sounds: Normal heart sounds, S1 normal and S2 normal. No murmur heard.     No friction rub. No gallop. No S3 or S4 sounds.   Pulmonary:      Effort: Pulmonary effort is normal.      Breath sounds: Normal breath sounds. No wheezing, rhonchi or rales.   Abdominal:      General: Bowel sounds are normal.      Palpations: Abdomen is soft.      Tenderness: There is no abdominal tenderness.   Musculoskeletal:      Cervical back: No tenderness.      " Right lower leg: No edema.      Left lower leg: No edema.   Lymphadenopathy:      Cervical: No cervical adenopathy.   Skin:     General: Skin is warm and dry.      Capillary Refill: Capillary refill takes less than 2 seconds.      Coloration: Skin is not cyanotic.      Findings: No petechiae or rash.      Nails: There is no clubbing.   Neurological:      Mental Status: She is alert.   Psychiatric:         Mood and Affect: Mood normal.         Behavior: Behavior is cooperative.           Result Review     The following data was reviewed by CYNDY Jacobo on 12/17/24.      CMP          11/23/2024    19:00   CMP   Glucose 102    BUN 19    Creatinine 1.09    EGFR 55.8    Sodium 138    Potassium 4.8    Chloride 102    Calcium 9.3    Total Protein 7.2    Albumin 4.0    Globulin 3.2    Total Bilirubin 0.3    Alkaline Phosphatase 39    AST (SGOT) 14    ALT (SGPT) 10    Albumin/Globulin Ratio 1.3    BUN/Creatinine Ratio 17.4    Anion Gap 10.1      CBC w/diff          11/23/2024    19:00   CBC w/Diff   WBC 8.19    RBC 4.57    Hemoglobin 11.9    Hematocrit 39.4    MCV 86.2    MCH 26.0    MCHC 30.2    RDW 15.1    Platelets 232    Neutrophil Rel % 60.4    Immature Granulocyte Rel % 0.5    Lymphocyte Rel % 28.0    Monocyte Rel % 8.2    Eosinophil Rel % 1.8    Basophil Rel % 1.1           Results for orders placed during the hospital encounter of 11/17/23    Adult Transthoracic Echo Complete w/ Color, Spectral and Contrast if necessary per protocol    Interpretation Summary    Technically difficult study.    Left ventricular ejection fraction appears to be 56 - 60%.    Left ventricular wall thickness is consistent with septal asymmetric hypertrophy.    Left ventricular diastolic function was indeterminate.    Mild aortic insufficiency.  No significant aortic stenosis with max/mean pressure gradient 8/4 mmHg.      No results found for this or any previous visit.          Procedures      Assessment & Plan  Shortness of  breath  Patient with sporadic episodes of shortness of breath.  Will check an echocardiogram.  She did have some mild AI on most recent echocardiogram.  She does have a soft murmur on exam.  Paroxysmal atrial fibrillation  Patient has not been on anticoagulation.  The importance of anticoagulation was discussed with her today.  I wrote a note for her nursing home to make sure that she resumes her anticoagulation.  It was evidently stopped during her recent ER visit where she had some rectal bleeding which was thought to be related to hemorrhoids.  However it was only supposed to be held for 2 doses.  Okay to resume.  New order sent.  Continue metoprolol.  She is in a normal rhythm on exam today.  Benign essential HTN  Blood pressure is well-controlled.  Continue current regimen  The medical services provided during this encounter are part of ongoing care related to this patient's single serious condition or complex condition.  Follow Up   Return in about 6 weeks (around 1/28/2025) for With CYNDY Vyas.    Patient was given instructions and counseling regarding her condition or for health maintenance advice. Please see specific information pulled into the AVS if appropriate.     CYNDY Jacobo  12/17/24  14:44 EST    Dictated Utilizing Dragon Dictation

## 2025-01-14 ENCOUNTER — HOSPITAL ENCOUNTER (OUTPATIENT)
Facility: HOSPITAL | Age: 70
Discharge: HOME | End: 2025-01-14
Payer: MEDICARE

## 2025-01-14 DIAGNOSIS — R06.02 SHORTNESS OF BREATH: ICD-10-CM

## 2025-01-14 PROCEDURE — 93306 TTE W/DOPPLER COMPLETE: CPT

## 2025-01-15 LAB
AORTIC DIMENSIONLESS INDEX: 0.55 (DI)
ASCENDING AORTA: 3.5 CM
AV MEAN PRESS GRAD SYS DOP V1V2: 6 MMHG
AV VMAX SYS DOP: 171 CM/SEC
BH CV ECHO MEAS - ACS: 1.3 CM
BH CV ECHO MEAS - AO MAX PG: 11.7 MMHG
BH CV ECHO MEAS - AO ROOT DIAM: 3 CM
BH CV ECHO MEAS - AO V2 VTI: 35.8 CM
BH CV ECHO MEAS - AVA(I,D): 1.74 CM2
BH CV ECHO MEAS - EDV(CUBED): 103.8 ML
BH CV ECHO MEAS - EDV(MOD-SP4): 139 ML
BH CV ECHO MEAS - EF(MOD-SP4): 42.7 %
BH CV ECHO MEAS - ESV(CUBED): 24.4 ML
BH CV ECHO MEAS - ESV(MOD-SP4): 79.6 ML
BH CV ECHO MEAS - FS: 38.3 %
BH CV ECHO MEAS - IVS/LVPW: 1 CM
BH CV ECHO MEAS - IVSD: 1.3 CM
BH CV ECHO MEAS - LA DIMENSION: 4.5 CM
BH CV ECHO MEAS - LAT PEAK E' VEL: 6.9 CM/SEC
BH CV ECHO MEAS - LV DIASTOLIC VOL/BSA (35-75): 68.7 CM2
BH CV ECHO MEAS - LV MASS(C)D: 237.9 GRAMS
BH CV ECHO MEAS - LV MAX PG: 3.5 MMHG
BH CV ECHO MEAS - LV MEAN PG: 2 MMHG
BH CV ECHO MEAS - LV SYSTOLIC VOL/BSA (12-30): 39.4 CM2
BH CV ECHO MEAS - LV V1 MAX: 92.9 CM/SEC
BH CV ECHO MEAS - LV V1 VTI: 19.8 CM
BH CV ECHO MEAS - LVIDD: 4.7 CM
BH CV ECHO MEAS - LVIDS: 2.9 CM
BH CV ECHO MEAS - LVOT AREA: 3.1 CM2
BH CV ECHO MEAS - LVOT DIAM: 2 CM
BH CV ECHO MEAS - LVPWD: 1.3 CM
BH CV ECHO MEAS - MED PEAK E' VEL: 5.1 CM/SEC
BH CV ECHO MEAS - MV A MAX VEL: 75.1 CM/SEC
BH CV ECHO MEAS - MV DEC TIME: 0.25 SEC
BH CV ECHO MEAS - MV E MAX VEL: 85.6 CM/SEC
BH CV ECHO MEAS - MV E/A: 1.14
BH CV ECHO MEAS - PA V2 MAX: 108 CM/SEC
BH CV ECHO MEAS - SV(LVOT): 62.2 ML
BH CV ECHO MEAS - SV(MOD-SP4): 59.4 ML
BH CV ECHO MEAS - SVI(LVOT): 30.8 ML/M2
BH CV ECHO MEAS - SVI(MOD-SP4): 29.4 ML/M2
BH CV ECHO MEASUREMENTS AVERAGE E/E' RATIO: 14.27
IVRT: 137 MS

## 2025-01-21 ENCOUNTER — HOSPITAL ENCOUNTER (OUTPATIENT)
Facility: HOSPITAL | Age: 70
Discharge: HOME | End: 2025-01-21
Admitting: INTERNAL MEDICINE
Payer: MEDICARE

## 2025-01-21 DIAGNOSIS — R55 SYNCOPE WITH ABNORMAL NEUROLOGIC EXAMINATION: ICD-10-CM

## 2025-01-21 DIAGNOSIS — R29.90 SYNCOPE WITH ABNORMAL NEUROLOGIC EXAMINATION: ICD-10-CM

## 2025-01-21 PROCEDURE — 93242 EXT ECG>48HR<7D RECORDING: CPT

## 2025-01-28 ENCOUNTER — OFFICE VISIT (OUTPATIENT)
Dept: CARDIOLOGY | Facility: CLINIC | Age: 70
End: 2025-01-28
Payer: MEDICARE

## 2025-01-28 VITALS
DIASTOLIC BLOOD PRESSURE: 69 MMHG | SYSTOLIC BLOOD PRESSURE: 121 MMHG | BODY MASS INDEX: 29.66 KG/M2 | HEART RATE: 59 BPM | HEIGHT: 67 IN | WEIGHT: 189 LBS

## 2025-01-28 DIAGNOSIS — E78.2 MIXED DYSLIPIDEMIA: ICD-10-CM

## 2025-01-28 DIAGNOSIS — R06.02 SHORTNESS OF BREATH: Primary | ICD-10-CM

## 2025-01-28 DIAGNOSIS — I10 BENIGN ESSENTIAL HTN: ICD-10-CM

## 2025-01-28 DIAGNOSIS — I48.0 PAROXYSMAL ATRIAL FIBRILLATION: ICD-10-CM

## 2025-01-28 PROCEDURE — 3078F DIAST BP <80 MM HG: CPT

## 2025-01-28 PROCEDURE — 1159F MED LIST DOCD IN RCRD: CPT

## 2025-01-28 PROCEDURE — 3074F SYST BP LT 130 MM HG: CPT

## 2025-01-28 PROCEDURE — 1160F RVW MEDS BY RX/DR IN RCRD: CPT

## 2025-01-28 PROCEDURE — 99214 OFFICE O/P EST MOD 30 MIN: CPT

## 2025-01-28 PROCEDURE — G2211 COMPLEX E/M VISIT ADD ON: HCPCS

## 2025-01-28 NOTE — ASSESSMENT & PLAN NOTE
Continue Eliquis 5 mg twice daily for stroke risk reduction.  Continue metoprolol.  She is in a normal rhythm on exam today.

## 2025-01-28 NOTE — PROGRESS NOTES
Chief Complaint  Follow-up    Subjective        History of Present Illness  Lydia Willoughby presents to [facility] CARDIOLOGY for follow up.   Patient is a 69-year-old female with past medical history outlined below, significant for diabetes, previous stroke, hypertension, A-fib, aortic valve regurgitation and hyperlipidemia who presents for follow-up.  She reports overall feeling well.  She was having some shortness of breath that occurred at rest but reports that she has been working really hard on her health and her symptoms have improved.  She has no chest pain or discomfort.  She has no dizziness, lightheadedness or edema.  She denies any palpitations.    Past Medical History:   Diagnosis Date    Congestive heart failure (CHF)     Diabetes mellitus 09/10/2018    TYPE II    Foot cramps     Foot pain, left     Foot pain, right     Fracture     Heart attack     High blood cholesterol     Hypertension     Ingrowing toenail 09/10/2018    Irregular heart beat     Obesity     Polyneuropathy     Pressure ulcer, stage 1     Stroke     Tinea unguium     Type 2 diabetes mellitus with foot ulcer     Type 2 diabetes mellitus with polyneuropathy        ALLERGY  Allergies   Allergen Reactions    Statins Unknown - High Severity    Penicillins Rash        Past Surgical History:   Procedure Laterality Date    CERVICAL DISC SURGERY      COLONOSCOPY N/A 10/30/2024    Procedure: COLONOSCOPY WITH HOT SNARE POLYPECTOMY;  Surgeon: Álvaro Alexander MD;  Location: [facility] ENDOSCOPY;  Service: Gastroenterology;  Laterality: N/A;  COLON POLYPS, DIVERTICULOSIS    ENDOSCOPY N/A 10/30/2024    Procedure: ESOPHAGOGASTRODUODENOSCOPY WITH BIOPSIES;  Surgeon: Álvaro Alexander MD;  Location: [facility] ENDOSCOPY;  Service: Gastroenterology;  Laterality: N/A;  HIATAL HERNIA, GASTRITIS    EYE SURGERY      CATARACT    TONSILLECTOMY      TUBAL ABDOMINAL LIGATION          Social History     Socioeconomic History    Marital  status:    Tobacco Use    Smoking status: Former     Passive exposure: Past    Smokeless tobacco: Former   Vaping Use    Vaping status: Never Used   Substance and Sexual Activity    Alcohol use: Yes     Comment: light    Drug use: Never    Sexual activity: Defer       Family History   Problem Relation Age of Onset    Diabetes Mother     Cancer Father     Stroke Brother     Diabetes Brother     Heart attack Other     Colon cancer Neg Hx         Current Outpatient Medications on File Prior to Visit   Medication Sig    acetaminophen (TYLENOL) 325 MG tablet Take 2 tablets by mouth Every 6 (Six) Hours As Needed for Mild Pain.    bisacodyl (DULCOLAX) 5 MG EC tablet Take 1 tablet by mouth Daily As Needed for Constipation.    busPIRone (BUSPAR) 5 MG tablet Take 1 tablet by mouth 2 (Two) Times a Day.    Cholecalciferol (Vitamin D3) 50 MCG (2000 UT) tablet Take 1 tablet by mouth Every 30 (Thirty) Days.    Eliquis 5 MG tablet tablet Take 1 tablet by mouth Every 12 (Twelve) Hours.    ezetimibe (ZETIA) 10 MG tablet Take 1 tablet by mouth Daily.    fenofibrate 160 MG tablet Take 1 tablet by mouth Daily.    levothyroxine (SYNTHROID, LEVOTHROID) 50 MCG tablet Take 1 tablet by mouth Daily.    lisinopril (PRINIVIL,ZESTRIL) 5 MG tablet Take 2 tablets by mouth Daily.    melatonin 5 MG tablet tablet Take 2 tablets by mouth At Night As Needed.    metFORMIN (GLUCOPHAGE) 500 MG tablet Take 1 tablet by mouth 2 (Two) Times a Day With Meals.    metoprolol succinate XL (TOPROL-XL) 25 MG 24 hr tablet Take 1 tablet by mouth Daily. (Patient taking differently: Take 0.5 tablets by mouth Daily. Hold tablet if pulse is less than 50)    nitroglycerin (NITRODUR) 0.2 MG/HR patch Place 1 patch on the skin as directed by provider See Admin Instructions. Apply patch daily, remove at night for at least 10 hours    Omega-3 300 MG capsule Take  by mouth.    ondansetron ODT (ZOFRAN-ODT) 4 MG disintegrating tablet Place 1 tablet on the tongue Every 8  "(Eight) Hours As Needed.    pantoprazole (PROTONIX) 40 MG EC tablet Take 1 tablet by mouth Daily.    senna (Senokot) 8.6 MG tablet Take 1 tablet by mouth Daily.     No current facility-administered medications on file prior to visit.       Objective   Vitals:    01/28/25 1457   BP: 121/69   Pulse: 59   Weight: 85.7 kg (189 lb)   Height: 170.2 cm (67\")       Physical Exam  Constitutional:       General: She is awake. She is not in acute distress.     Appearance: Normal appearance.   HENT:      Head: Normocephalic.      Nose: Nose normal. No congestion.   Eyes:      Extraocular Movements: Extraocular movements intact.      Conjunctiva/sclera: Conjunctivae normal.      Pupils: Pupils are equal, round, and reactive to light.   Neck:      Thyroid: No thyromegaly.      Vascular: No JVD.   Cardiovascular:      Rate and Rhythm: Normal rate and regular rhythm.      Chest Wall: PMI is not displaced.      Pulses: Normal pulses.      Heart sounds: Normal heart sounds, S1 normal and S2 normal. No murmur heard.     No friction rub. No gallop. No S3 or S4 sounds.   Pulmonary:      Effort: Pulmonary effort is normal.      Breath sounds: Normal breath sounds. No wheezing, rhonchi or rales.   Abdominal:      General: Bowel sounds are normal.      Palpations: Abdomen is soft.      Tenderness: There is no abdominal tenderness.   Musculoskeletal:      Cervical back: No tenderness.      Right lower leg: No edema.      Left lower leg: No edema.   Lymphadenopathy:      Cervical: No cervical adenopathy.   Skin:     General: Skin is warm and dry.      Capillary Refill: Capillary refill takes less than 2 seconds.      Coloration: Skin is not cyanotic.      Findings: No petechiae or rash.      Nails: There is no clubbing.   Neurological:      Mental Status: She is alert.   Psychiatric:         Mood and Affect: Mood normal.         Behavior: Behavior is cooperative.           Result Review     The following data was reviewed by Shivani HERRERA" KamarCYNDY on 01/28/25.      CMP          11/23/2024    19:00   CMP   Glucose 102    BUN 19    Creatinine 1.09    EGFR 55.8    Sodium 138    Potassium 4.8    Chloride 102    Calcium 9.3    Total Protein 7.2    Albumin 4.0    Globulin 3.2    Total Bilirubin 0.3    Alkaline Phosphatase 39    AST (SGOT) 14    ALT (SGPT) 10    Albumin/Globulin Ratio 1.3    BUN/Creatinine Ratio 17.4    Anion Gap 10.1      CBC w/diff          11/23/2024    19:00   CBC w/Diff   WBC 8.19    RBC 4.57    Hemoglobin 11.9    Hematocrit 39.4    MCV 86.2    MCH 26.0    MCHC 30.2    RDW 15.1    Platelets 232    Neutrophil Rel % 60.4    Immature Granulocyte Rel % 0.5    Lymphocyte Rel % 28.0    Monocyte Rel % 8.2    Eosinophil Rel % 1.8    Basophil Rel % 1.1           Results for orders placed during the hospital encounter of 01/14/25    Adult Transthoracic Echo Complete w/ Color, Spectral and Contrast if necessary per protocol    Interpretation Summary    Technically difficult study.    Myocardium not well-visualized but appears left ventricular ejection fraction at 51 - 55%.  No obvious regional wall motion abnormalities.    Left ventricular wall thickness is consistent with septal asymmetric hypertrophy.    Left ventricular diastolic function was indeterminate.    The left atrial cavity is mildly dilated.    Mild aortic insufficiency.  No significant aortic stenosis.      No results found for this or any previous visit.          Procedures      Assessment & Plan  Shortness of breath  Shortness of breath has improved without specific intervention.  Recent echo demonstrated normal LV systolic function with no obvious wall motion abnormalities, left ventricular wall consistent with septal asymmetric hypertrophy, mild AI.  No obvious cause of her shortness of breath.  She is euvolemic on exam.  I do not feel her shortness of breath is related to congestive heart failure.  Recommend that we just continue to monitor for now.  If this returns can  consider ischemic evaluation.  Paroxysmal atrial fibrillation  Continue Eliquis 5 mg twice daily for stroke risk reduction.  Continue metoprolol.  She is in a normal rhythm on exam today.  Benign essential HTN  Blood pressure is well-controlled.  Continue current regimen.  Mixed dyslipidemia  Continue the Zetia.  The medical services provided during this encounter are part of ongoing care related to this patient's single serious condition or complex condition.    Follow Up   Return in about 6 months (around 7/28/2025) for With Dr. Palumbo.    Patient was given instructions and counseling regarding her condition or for health maintenance advice. Please see specific information pulled into the AVS if appropriate.     Shivani Galvin, APRN  01/28/25  16:14 EST    Dictated Utilizing Dragon Dictation

## 2025-02-13 LAB
CV ZIO BASELINE AVG BPM: 63
CV ZIO BASELINE BPM HIGH: 122
CV ZIO BASELINE BPM LOW: 28

## 2025-08-25 ENCOUNTER — OFFICE VISIT (OUTPATIENT)
Dept: CARDIOLOGY | Facility: CLINIC | Age: 70
End: 2025-08-25
Payer: MEDICARE

## 2025-08-25 VITALS
WEIGHT: 189.4 LBS | SYSTOLIC BLOOD PRESSURE: 136 MMHG | BODY MASS INDEX: 29.73 KG/M2 | HEART RATE: 58 BPM | HEIGHT: 67 IN | DIASTOLIC BLOOD PRESSURE: 80 MMHG

## 2025-08-25 DIAGNOSIS — I10 BENIGN ESSENTIAL HTN: ICD-10-CM

## 2025-08-25 DIAGNOSIS — E78.5 HYPERLIPIDEMIA LDL GOAL <70: ICD-10-CM

## 2025-08-25 DIAGNOSIS — R01.1 HEART MURMUR: ICD-10-CM

## 2025-08-25 DIAGNOSIS — I48.0 PAROXYSMAL ATRIAL FIBRILLATION: Primary | ICD-10-CM

## 2025-08-25 PROCEDURE — 3075F SYST BP GE 130 - 139MM HG: CPT | Performed by: INTERNAL MEDICINE

## 2025-08-25 PROCEDURE — 3079F DIAST BP 80-89 MM HG: CPT | Performed by: INTERNAL MEDICINE

## 2025-08-25 PROCEDURE — 99214 OFFICE O/P EST MOD 30 MIN: CPT | Performed by: INTERNAL MEDICINE

## 2025-08-25 RX ORDER — SERTRALINE HYDROCHLORIDE 100 MG/1
100 TABLET, FILM COATED ORAL DAILY
COMMUNITY

## (undated) DEVICE — SOLIDIFIER LIQLOC PLS 1500CC BT

## (undated) DEVICE — SOL IRRG H2O PL/BG 1000ML STRL

## (undated) DEVICE — SNAR POLYP CAPTIFLEX XS/OVL 11X2.4MM 240CM 1P/U

## (undated) DEVICE — CONN JET HYDRA H20 AUXILIARY DISP

## (undated) DEVICE — LINER SURG CANSTR SXN S/RIGD 1500CC

## (undated) DEVICE — Device

## (undated) DEVICE — SINGLE-USE BIOPSY FORCEPS: Brand: RADIAL JAW 4

## (undated) DEVICE — PAD GRND REM POLYHESIVE A/ DISP

## (undated) DEVICE — Device: Brand: DEFENDO AIR/WATER/SUCTION AND BIOPSY VALVE

## (undated) DEVICE — THE SINGLE USE ETRAP – POLYP TRAP IS USED FOR SUCTION RETRIEVAL OF ENDOSCOPICALLY REMOVED POLYPS.: Brand: ETRAP

## (undated) DEVICE — BLCK/BITE BLOX WO/DENTL/RIM W/STRAP 54F